# Patient Record
Sex: MALE | Race: WHITE | NOT HISPANIC OR LATINO | Employment: UNEMPLOYED | URBAN - NONMETROPOLITAN AREA
[De-identification: names, ages, dates, MRNs, and addresses within clinical notes are randomized per-mention and may not be internally consistent; named-entity substitution may affect disease eponyms.]

---

## 2021-07-26 ENCOUNTER — OFFICE VISIT (OUTPATIENT)
Dept: PSYCHIATRY | Facility: CLINIC | Age: 46
End: 2021-07-26

## 2021-07-26 ENCOUNTER — LAB (OUTPATIENT)
Dept: LAB | Facility: HOSPITAL | Age: 46
End: 2021-07-26

## 2021-07-26 VITALS
HEART RATE: 75 BPM | DIASTOLIC BLOOD PRESSURE: 76 MMHG | BODY MASS INDEX: 31.57 KG/M2 | SYSTOLIC BLOOD PRESSURE: 131 MMHG | HEIGHT: 66 IN | WEIGHT: 196.4 LBS

## 2021-07-26 DIAGNOSIS — F41.1 GENERALIZED ANXIETY DISORDER: ICD-10-CM

## 2021-07-26 DIAGNOSIS — F33.3 SEVERE EPISODE OF RECURRENT MAJOR DEPRESSIVE DISORDER, WITH PSYCHOTIC FEATURES (HCC): Primary | ICD-10-CM

## 2021-07-26 DIAGNOSIS — Z79.899 MEDICATION MANAGEMENT: ICD-10-CM

## 2021-07-26 LAB
AMPHETAMINE CUT-OFF: NORMAL
BENZODIAZIPINE CUT-OFF: NORMAL
BUPRENORPHINE CUT-OFF: NORMAL
COCAINE CUT-OFF: NORMAL
EXTERNAL AMPHETAMINE SCREEN URINE: NEGATIVE
EXTERNAL BENZODIAZEPINE SCREEN URINE: NEGATIVE
EXTERNAL BUPRENORPHINE SCREEN URINE: NEGATIVE
EXTERNAL COCAINE SCREEN URINE: NEGATIVE
EXTERNAL MDMA: NEGATIVE
EXTERNAL METHADONE SCREEN URINE: NEGATIVE
EXTERNAL METHAMPHETAMINE SCREEN URINE: NEGATIVE
EXTERNAL OPIATES SCREEN URINE: NEGATIVE
EXTERNAL OXYCODONE SCREEN URINE: NEGATIVE
EXTERNAL THC SCREEN URINE: NEGATIVE
MDMA CUT-OFF: NORMAL
METHADONE CUT-OFF: NORMAL
METHAMPHETAMINE CUT-OFF: NORMAL
OPIATES CUT-OFF: NORMAL
OXYCODONE CUT-OFF: NORMAL
THC CUT-OFF: NORMAL

## 2021-07-26 PROCEDURE — 84439 ASSAY OF FREE THYROXINE: CPT | Performed by: NURSE PRACTITIONER

## 2021-07-26 PROCEDURE — 80061 LIPID PANEL: CPT | Performed by: NURSE PRACTITIONER

## 2021-07-26 PROCEDURE — 90792 PSYCH DIAG EVAL W/MED SRVCS: CPT | Performed by: NURSE PRACTITIONER

## 2021-07-26 PROCEDURE — 80050 GENERAL HEALTH PANEL: CPT | Performed by: NURSE PRACTITIONER

## 2021-07-26 PROCEDURE — 83036 HEMOGLOBIN GLYCOSYLATED A1C: CPT | Performed by: NURSE PRACTITIONER

## 2021-07-26 RX ORDER — BUPROPION HYDROCHLORIDE 150 MG/1
150 TABLET ORAL EVERY MORNING
Qty: 30 TABLET | Refills: 0 | Status: SHIPPED | OUTPATIENT
Start: 2021-07-26 | End: 2021-08-18 | Stop reason: SDUPTHER

## 2021-07-26 RX ORDER — INSULIN GLARGINE 100 [IU]/ML
INJECTION, SOLUTION SUBCUTANEOUS
COMMUNITY
Start: 2021-06-10

## 2021-07-26 RX ORDER — IBUPROFEN 600 MG/1
TABLET ORAL
COMMUNITY
Start: 2021-07-16

## 2021-07-26 RX ORDER — VENLAFAXINE HYDROCHLORIDE 37.5 MG/1
37.5 CAPSULE, EXTENDED RELEASE ORAL DAILY
Qty: 30 CAPSULE | Refills: 0 | Status: SHIPPED | OUTPATIENT
Start: 2021-07-26 | End: 2021-08-18 | Stop reason: SDUPTHER

## 2021-07-26 RX ORDER — EMPAGLIFLOZIN 25 MG/1
TABLET, FILM COATED ORAL
COMMUNITY
Start: 2021-06-18

## 2021-07-26 RX ORDER — OMEGA-3-ACID ETHYL ESTERS 1 G/1
CAPSULE, LIQUID FILLED ORAL
COMMUNITY
Start: 2021-05-04

## 2021-07-26 RX ORDER — VENLAFAXINE HYDROCHLORIDE 75 MG/1
CAPSULE, EXTENDED RELEASE ORAL
COMMUNITY
Start: 2021-07-12 | End: 2021-07-26 | Stop reason: SDUPTHER

## 2021-07-26 RX ORDER — ARIPIPRAZOLE 2 MG/1
TABLET ORAL
COMMUNITY
Start: 2021-07-22 | End: 2021-07-26 | Stop reason: SDUPTHER

## 2021-07-26 RX ORDER — SIMVASTATIN 40 MG
TABLET ORAL
COMMUNITY
Start: 2021-07-12

## 2021-07-26 RX ORDER — LEVOCETIRIZINE DIHYDROCHLORIDE 5 MG/1
TABLET, FILM COATED ORAL
COMMUNITY
Start: 2021-07-19

## 2021-07-26 RX ORDER — SITAGLIPTIN 100 MG/1
TABLET, FILM COATED ORAL
COMMUNITY
Start: 2021-06-22

## 2021-07-26 RX ORDER — ALLOPURINOL 300 MG/1
TABLET ORAL
COMMUNITY
Start: 2021-07-12

## 2021-07-26 RX ORDER — OMEPRAZOLE 40 MG/1
CAPSULE, DELAYED RELEASE ORAL
COMMUNITY
Start: 2021-07-12

## 2021-07-26 RX ORDER — TAMSULOSIN HYDROCHLORIDE 0.4 MG/1
CAPSULE ORAL
COMMUNITY
Start: 2021-07-12

## 2021-07-27 LAB
ALBUMIN SERPL-MCNC: 4.7 G/DL (ref 3.5–5.2)
ALBUMIN/GLOB SERPL: 2.6 G/DL
ALP SERPL-CCNC: 77 U/L (ref 39–117)
ALT SERPL W P-5'-P-CCNC: 44 U/L (ref 1–41)
ANION GAP SERPL CALCULATED.3IONS-SCNC: 11.9 MMOL/L (ref 5–15)
AST SERPL-CCNC: 46 U/L (ref 1–40)
BASOPHILS # BLD AUTO: 0.05 10*3/MM3 (ref 0–0.2)
BASOPHILS NFR BLD AUTO: 0.6 % (ref 0–1.5)
BILIRUB SERPL-MCNC: 0.8 MG/DL (ref 0–1.2)
BUN SERPL-MCNC: 12 MG/DL (ref 6–20)
BUN/CREAT SERPL: 11.2 (ref 7–25)
CALCIUM SPEC-SCNC: 9.4 MG/DL (ref 8.6–10.5)
CHLORIDE SERPL-SCNC: 104 MMOL/L (ref 98–107)
CHOLEST SERPL-MCNC: 166 MG/DL (ref 0–200)
CO2 SERPL-SCNC: 27.1 MMOL/L (ref 22–29)
CREAT SERPL-MCNC: 1.07 MG/DL (ref 0.76–1.27)
DEPRECATED RDW RBC AUTO: 46.1 FL (ref 37–54)
EOSINOPHIL # BLD AUTO: 0.14 10*3/MM3 (ref 0–0.4)
EOSINOPHIL NFR BLD AUTO: 1.7 % (ref 0.3–6.2)
ERYTHROCYTE [DISTWIDTH] IN BLOOD BY AUTOMATED COUNT: 13.2 % (ref 12.3–15.4)
GFR SERPL CREATININE-BSD FRML MDRD: 74 ML/MIN/1.73
GLOBULIN UR ELPH-MCNC: 1.8 GM/DL
GLUCOSE SERPL-MCNC: 117 MG/DL (ref 65–99)
HBA1C MFR BLD: 7.51 % (ref 4.8–5.6)
HCT VFR BLD AUTO: 47.9 % (ref 37.5–51)
HDLC SERPL-MCNC: 38 MG/DL (ref 40–60)
HGB BLD-MCNC: 16.2 G/DL (ref 13–17.7)
IMM GRANULOCYTES # BLD AUTO: 0.02 10*3/MM3 (ref 0–0.05)
IMM GRANULOCYTES NFR BLD AUTO: 0.2 % (ref 0–0.5)
LDLC SERPL CALC-MCNC: 77 MG/DL (ref 0–100)
LDLC/HDLC SERPL: 1.72 {RATIO}
LYMPHOCYTES # BLD AUTO: 2.66 10*3/MM3 (ref 0.7–3.1)
LYMPHOCYTES NFR BLD AUTO: 32.6 % (ref 19.6–45.3)
MCH RBC QN AUTO: 31.7 PG (ref 26.6–33)
MCHC RBC AUTO-ENTMCNC: 33.8 G/DL (ref 31.5–35.7)
MCV RBC AUTO: 93.7 FL (ref 79–97)
MONOCYTES # BLD AUTO: 0.55 10*3/MM3 (ref 0.1–0.9)
MONOCYTES NFR BLD AUTO: 6.7 % (ref 5–12)
NEUTROPHILS NFR BLD AUTO: 4.73 10*3/MM3 (ref 1.7–7)
NEUTROPHILS NFR BLD AUTO: 58.2 % (ref 42.7–76)
NRBC BLD AUTO-RTO: 0 /100 WBC (ref 0–0.2)
PLATELET # BLD AUTO: 167 10*3/MM3 (ref 140–450)
PMV BLD AUTO: 11.4 FL (ref 6–12)
POTASSIUM SERPL-SCNC: 3.4 MMOL/L (ref 3.5–5.2)
PROT SERPL-MCNC: 6.5 G/DL (ref 6–8.5)
RBC # BLD AUTO: 5.11 10*6/MM3 (ref 4.14–5.8)
SODIUM SERPL-SCNC: 143 MMOL/L (ref 136–145)
T4 FREE SERPL-MCNC: 1.08 NG/DL (ref 0.93–1.7)
TRIGL SERPL-MCNC: 314 MG/DL (ref 0–150)
TSH SERPL DL<=0.05 MIU/L-ACNC: 3.64 UIU/ML (ref 0.27–4.2)
VLDLC SERPL-MCNC: 51 MG/DL (ref 5–40)
WBC # BLD AUTO: 8.15 10*3/MM3 (ref 3.4–10.8)

## 2021-08-18 DIAGNOSIS — F33.3 SEVERE EPISODE OF RECURRENT MAJOR DEPRESSIVE DISORDER, WITH PSYCHOTIC FEATURES (HCC): ICD-10-CM

## 2021-08-18 DIAGNOSIS — F41.1 GENERALIZED ANXIETY DISORDER: ICD-10-CM

## 2021-08-18 RX ORDER — VENLAFAXINE HYDROCHLORIDE 37.5 MG/1
37.5 CAPSULE, EXTENDED RELEASE ORAL DAILY
Qty: 30 CAPSULE | Refills: 0 | Status: SHIPPED | OUTPATIENT
Start: 2021-08-18 | End: 2021-09-15

## 2021-08-18 RX ORDER — VENLAFAXINE HYDROCHLORIDE 37.5 MG/1
37.5 CAPSULE, EXTENDED RELEASE ORAL DAILY
Qty: 30 CAPSULE | Refills: 0 | Status: CANCELLED | OUTPATIENT
Start: 2021-08-18

## 2021-08-18 RX ORDER — BUPROPION HYDROCHLORIDE 150 MG/1
150 TABLET ORAL EVERY MORNING
Qty: 30 TABLET | Refills: 0 | Status: SHIPPED | OUTPATIENT
Start: 2021-08-18 | End: 2021-09-15 | Stop reason: SDUPTHER

## 2021-08-18 RX ORDER — BUPROPION HYDROCHLORIDE 150 MG/1
150 TABLET ORAL EVERY MORNING
Qty: 30 TABLET | Refills: 0 | Status: CANCELLED | OUTPATIENT
Start: 2021-08-18

## 2021-09-01 NOTE — PROGRESS NOTES
"This provider is located at the [facility] (through [facility]), [address], [place], [address] using a secure [employer] Video Visit through [employer]. Patient is being seen remotely via telehealth at their home address in [place], and stated they are in a secure environment for this session. The patient's condition being diagnosed/treated is appropriate for telemedicine. The provider identified herself as well as her credentials.   The patient, and/or patients guardian, consent to be seen remotely, and when consent is given they understand that the consent allows for patient identifiable information to be sent to a third party as needed.   They may refuse to be seen remotely at any time. The electronic data is encrypted and password protected, and the patient and/or guardian has been advised of the potential risks to privacy not withstanding such measures.    Roscoe Bonilla is a 45 y.o. male who presents today for follow up    Chief Complaint:  Anxiety and depression    History of Present Illness:   Today is a follow-up visit. Wife is present during visit.  He is taking his medication as prescribed, denies side effects. He is doing \"pretty good\".  He is c/o increased anxiety. He states he has been having \"dreams\", not nightmares. Depression rated 10/10, anxiety rated 10/10, with 10 being the worst.  Sleeping is ok, getting about 8 hours a night. Appetite is good.  Denies SI/HI/AVH.  Denies thoughts of self-harm.             The following portions of the patient's history were reviewed and updated as appropriate: allergies, current medications, past family history, past medical history, past social history, past surgical history and problem list.      Past Medical History:  Past Medical History:   Diagnosis Date   • Anxiety    • Depression    • Diabetes (CMS/HCC)    • GERD (gastroesophageal reflux disease)    • Gout    • High cholesterol        Social " History:  Social History     Socioeconomic History   • Marital status:      Spouse name: Not on file   • Number of children: Not on file   • Years of education: Not on file   • Highest education level: Not on file   Tobacco Use   • Smoking status: Never Smoker   • Smokeless tobacco: Never Used   Vaping Use   • Vaping Use: Never used   Substance and Sexual Activity   • Alcohol use: Never   • Drug use: Never   • Sexual activity: Defer       Family History:  History reviewed. No pertinent family history.    Past Surgical History:  Past Surgical History:   Procedure Laterality Date   • APPENDECTOMY     • CHOLECYSTECTOMY         Problem List:  There is no problem list on file for this patient.       Allergy:   Allergies   Allergen Reactions   • Codeine Nausea Only   • Lopid [Gemfibrozil] Hives   • Metformin Dizziness   • Phenergan [Promethazine] Other (See Comments)     Makes patient sleep too much.     • Sulfa Antibiotics Hives, Itching and Nausea Only        Current Medications:   Current Outpatient Medications   Medication Sig Dispense Refill   • allopurinol (ZYLOPRIM) 300 MG tablet      • ARIPiprazole, sensor, 5 MG tablet Take 10 mg by mouth Daily. 60 tablet 0   • buPROPion XL (Wellbutrin XL) 150 MG 24 hr tablet Take 1 tablet by mouth Every Morning. 30 tablet 0   • hydrOXYzine pamoate (Vistaril) 25 MG capsule Take 1 capsule by mouth 3 (Three) Times a Day As Needed for Anxiety. 60 capsule 0   • ibuprofen (ADVIL,MOTRIN) 600 MG tablet      • Januvia 100 MG tablet      • Jardiance 25 MG tablet tablet      • Lantus SoloStar 100 UNIT/ML injection pen      • levocetirizine (XYZAL) 5 MG tablet      • omega-3 acid ethyl esters (LOVAZA) 1 g capsule      • omeprazole (priLOSEC) 40 MG capsule      • simvastatin (ZOCOR) 40 MG tablet      • tamsulosin (FLOMAX) 0.4 MG capsule 24 hr capsule        No current facility-administered medications for this visit.       Review of Symptoms:    Review of Systems   Constitutional:  Negative.    HENT: Negative.    Eyes: Negative.    Respiratory: Negative.    Cardiovascular: Negative.    Neurological: Negative.    Psychiatric/Behavioral: Positive for depressed mood. The patient is nervous/anxious.          Physical Exam:   There were no vitals taken for this visit.  There is no height or weight on file to calculate BMI.    Appearance:  male appears stated age, no acute distress noted.    Gait, Station, Strength: Steady, posture erect, WNL      Mental Status Exam:   Hygiene:   good  Cooperation:  Cooperative  Eye Contact:  Good  Psychomotor Behavior:  Appropriate  Affect:  Appropriate  Mood: normal  Hopelessness: Denies  Speech:  Normal  Thought Process:  Goal directed and Linear  Thought Content:  Normal  Suicidal:  None  Homicidal:  None  Hallucinations:  None  Delusion:  None  Memory:  Intact  Orientation:  Person, Place, Time and Situation  Reliability:  good  Insight:  Good  Judgement:  Good  Impulse Control:  Good  Physical/Medical Issues:  No      PHQ-Score Total:  PHQ-9 Total Score:        Lab Results:   No visits with results within 1 Month(s) from this visit.   Latest known visit with results is:   Office Visit on 07/26/2021   Component Date Value Ref Range Status   • External Amphetamine Screen Urine 07/26/2021 Negative   Final   • Amphetamine Cut-Off 07/26/2021 1000ng/ml   Final   • External Benzodiazepine Screen Uri* 07/26/2021 Negative   Final   • Benzodiazipine Cut-Off 07/26/2021 300ng/ml   Final   • External Cocaine Screen Urine 07/26/2021 Negative   Final   • Cocaine Cut-Off 07/26/2021 300ng/ml   Final   • External THC Screen Urine 07/26/2021 Negative   Final   • THC Cut-Off 07/26/2021 50ng/ml   Final   • External Methadone Screen Urine 07/26/2021 Negative   Final   • Methadone Cut-Off 07/26/2021 300ng/ml   Final   • External Methamphetamine Screen Ur* 07/26/2021 Negative   Final   • Methamphetamine Cut-Off 07/26/2021 1000ng/ml   Final   • External Oxycodone Screen Urine  07/26/2021 Negative   Final   • Oxycodone Cut-Off 07/26/2021 100ng/ml   Final   • External Buprenorphine Screen Urine 07/26/2021 Negative   Final   • Buprenorphine Cut-Off 07/26/2021 10ng/ml   Final   • External MDMA 07/26/2021 Negative   Final   • MDMA Cut-Off 07/26/2021 500ng/ml   Final   • External Opiates Screen Urine 07/26/2021 Negative   Final   • Opiates Cut-Off 07/26/2021 300ng/ml   Final   • Glucose 07/26/2021 117* 65 - 99 mg/dL Final   • BUN 07/26/2021 12  6 - 20 mg/dL Final   • Creatinine 07/26/2021 1.07  0.76 - 1.27 mg/dL Final   • Sodium 07/26/2021 143  136 - 145 mmol/L Final   • Potassium 07/26/2021 3.4* 3.5 - 5.2 mmol/L Final   • Chloride 07/26/2021 104  98 - 107 mmol/L Final   • CO2 07/26/2021 27.1  22.0 - 29.0 mmol/L Final   • Calcium 07/26/2021 9.4  8.6 - 10.5 mg/dL Final   • Total Protein 07/26/2021 6.5  6.0 - 8.5 g/dL Final   • Albumin 07/26/2021 4.70  3.50 - 5.20 g/dL Final   • ALT (SGPT) 07/26/2021 44* 1 - 41 U/L Final   • AST (SGOT) 07/26/2021 46* 1 - 40 U/L Final   • Alkaline Phosphatase 07/26/2021 77  39 - 117 U/L Final   • Total Bilirubin 07/26/2021 0.8  0.0 - 1.2 mg/dL Final   • eGFR Non African Amer 07/26/2021 74  >60 mL/min/1.73 Final   • Globulin 07/26/2021 1.8  gm/dL Final   • A/G Ratio 07/26/2021 2.6  g/dL Final   • BUN/Creatinine Ratio 07/26/2021 11.2  7.0 - 25.0 Final   • Anion Gap 07/26/2021 11.9  5.0 - 15.0 mmol/L Final   • Hemoglobin A1C 07/26/2021 7.51* 4.80 - 5.60 % Final   • Total Cholesterol 07/26/2021 166  0 - 200 mg/dL Final   • Triglycerides 07/26/2021 314* 0 - 150 mg/dL Final   • HDL Cholesterol 07/26/2021 38* 40 - 60 mg/dL Final   • LDL Cholesterol  07/26/2021 77  0 - 100 mg/dL Final   • VLDL Cholesterol 07/26/2021 51* 5 - 40 mg/dL Final   • LDL/HDL Ratio 07/26/2021 1.72   Final   • TSH 07/26/2021 3.640  0.270 - 4.200 uIU/mL Final   • Free T4 07/26/2021 1.08  0.93 - 1.70 ng/dL Final   • WBC 07/26/2021 8.15  3.40 - 10.80 10*3/mm3 Final   • RBC 07/26/2021 5.11  4.14 - 5.80  10*6/mm3 Final   • Hemoglobin 07/26/2021 16.2  13.0 - 17.7 g/dL Final   • Hematocrit 07/26/2021 47.9  37.5 - 51.0 % Final   • MCV 07/26/2021 93.7  79.0 - 97.0 fL Final   • MCH 07/26/2021 31.7  26.6 - 33.0 pg Final   • MCHC 07/26/2021 33.8  31.5 - 35.7 g/dL Final   • RDW 07/26/2021 13.2  12.3 - 15.4 % Final   • RDW-SD 07/26/2021 46.1  37.0 - 54.0 fl Final   • MPV 07/26/2021 11.4  6.0 - 12.0 fL Final   • Platelets 07/26/2021 167  140 - 450 10*3/mm3 Final   • Neutrophil % 07/26/2021 58.2  42.7 - 76.0 % Final   • Lymphocyte % 07/26/2021 32.6  19.6 - 45.3 % Final   • Monocyte % 07/26/2021 6.7  5.0 - 12.0 % Final   • Eosinophil % 07/26/2021 1.7  0.3 - 6.2 % Final   • Basophil % 07/26/2021 0.6  0.0 - 1.5 % Final   • Immature Grans % 07/26/2021 0.2  0.0 - 0.5 % Final   • Neutrophils, Absolute 07/26/2021 4.73  1.70 - 7.00 10*3/mm3 Final   • Lymphocytes, Absolute 07/26/2021 2.66  0.70 - 3.10 10*3/mm3 Final   • Monocytes, Absolute 07/26/2021 0.55  0.10 - 0.90 10*3/mm3 Final   • Eosinophils, Absolute 07/26/2021 0.14  0.00 - 0.40 10*3/mm3 Final   • Basophils, Absolute 07/26/2021 0.05  0.00 - 0.20 10*3/mm3 Final   • Immature Grans, Absolute 07/26/2021 0.02  0.00 - 0.05 10*3/mm3 Final   • nRBC 07/26/2021 0.0  0.0 - 0.2 /100 WBC Final       Assessment/Plan   Problems Addressed this Visit     None      Visit Diagnoses     Severe episode of recurrent major depressive disorder, with psychotic features (CMS/HCC)        Relevant Medications    ARIPiprazole, sensor, 5 MG tablet    buPROPion XL (Wellbutrin XL) 150 MG 24 hr tablet    hydrOXYzine pamoate (Vistaril) 25 MG capsule    Generalized anxiety disorder        Relevant Medications    ARIPiprazole, sensor, 5 MG tablet    buPROPion XL (Wellbutrin XL) 150 MG 24 hr tablet    hydrOXYzine pamoate (Vistaril) 25 MG capsule      Diagnoses       Codes Comments    Severe episode of recurrent major depressive disorder, with psychotic features (CMS/HCC)     ICD-10-CM: F33.3  ICD-9-CM: 296.34      Generalized anxiety disorder     ICD-10-CM: F41.1  ICD-9-CM: 300.02         Social History     Tobacco Use   Smoking Status Never Smoker   Smokeless Tobacco Never Used     MAGGIE reviewed and appropriate. Patient counseled on use of controlled substances.     -The benefits of a healthy diet and exercise were discussed with patient, especially the positive effects they have on mental health. Patient encouraged to consider lifestyle modification regarding  diet and exercise patterns to maximize results of mental health treatment.  -Reviewed previous available documentation  -Reviewed most recent available labs   -Session began at 1:45 pm and ended at 2:00 pm    Visit Diagnoses:    ICD-10-CM ICD-9-CM   1. Severe episode of recurrent major depressive disorder, with psychotic features (CMS/HCC)  F33.3 296.34   2. Generalized anxiety disorder  F41.1 300.02       TREATMENT PLAN/GOALS: Continue supportive psychotherapy efforts and medications as indicated. Treatment and medication options discussed during today's visit. Patient acknowledged and verbally consented to continue with current treatment plan and was educated on the importance of compliance with treatment and follow-up appointments.    MEDICATION ISSUES:    Discussed medication options and treatment plan of prescribed medication as well as the risks, benefits, and side effects including potential falls, possible impaired driving and metabolic adversities among others. Patient is agreeable to call the office with any worsening of symptoms or onset of side effects. Patient is agreeable to call 911 or go to the nearest ER should he/she begin having SI/HI.     MEDS ORDERED DURING VISIT:  New Medications Ordered This Visit   Medications   • ARIPiprazole, sensor, 5 MG tablet     Sig: Take 10 mg by mouth Daily.     Dispense:  60 tablet     Refill:  0   • buPROPion XL (Wellbutrin XL) 150 MG 24 hr tablet     Sig: Take 1 tablet by mouth Every Morning.     Dispense:  30  tablet     Refill:  0   • hydrOXYzine pamoate (Vistaril) 25 MG capsule     Sig: Take 1 capsule by mouth 3 (Three) Times a Day As Needed for Anxiety.     Dispense:  60 capsule     Refill:  0       Return in about 1 month (around 10/15/2021) for Recheck.   -Discontinue Effexor.  -Increase aripiprazole 5 mg tablet take 2 tablets by mouth daily for depression and anxiety.  -Continue Wellbutrin  mg 24-hour tablet take 1 tablet by mouth every day for anxiety and depression.  -Add hydroxyzine 25 mg capsule take 1 capsule by mouth 3 times a day as needed for anxiety.    I spent 30 minutes caring for Tanner on this date of service. This time includes time spent by me in the following activities: preparing for the visit, obtaining and/or reviewing a separately obtained history, performing a medically appropriate examination and/or evaluation, counseling and educating the patient/family/caregiver, ordering medications, tests, or procedures and documenting information in the medical record               This document has been electronically signed by ARSEN Bowden  September 15, 2021 14:02 EDT    Part of this note may be an electronic transcription/translation of spoken language to printed text using the Dragon Dictation System.

## 2021-09-15 ENCOUNTER — TELEMEDICINE (OUTPATIENT)
Dept: PSYCHIATRY | Facility: CLINIC | Age: 46
End: 2021-09-15

## 2021-09-15 DIAGNOSIS — F41.1 GENERALIZED ANXIETY DISORDER: ICD-10-CM

## 2021-09-15 DIAGNOSIS — F33.3 SEVERE EPISODE OF RECURRENT MAJOR DEPRESSIVE DISORDER, WITH PSYCHOTIC FEATURES (HCC): ICD-10-CM

## 2021-09-15 PROCEDURE — 99214 OFFICE O/P EST MOD 30 MIN: CPT | Performed by: NURSE PRACTITIONER

## 2021-09-15 RX ORDER — BUPROPION HYDROCHLORIDE 150 MG/1
150 TABLET ORAL EVERY MORNING
Qty: 30 TABLET | Refills: 0 | Status: SHIPPED | OUTPATIENT
Start: 2021-09-15 | End: 2021-10-13 | Stop reason: SDUPTHER

## 2021-09-15 RX ORDER — HYDROXYZINE PAMOATE 25 MG/1
25 CAPSULE ORAL 3 TIMES DAILY PRN
Qty: 60 CAPSULE | Refills: 0 | Status: SHIPPED | OUTPATIENT
Start: 2021-09-15 | End: 2021-10-13 | Stop reason: SDUPTHER

## 2021-09-21 ENCOUNTER — TELEPHONE (OUTPATIENT)
Dept: PSYCHIATRY | Facility: CLINIC | Age: 46
End: 2021-09-21

## 2021-09-21 NOTE — TELEPHONE ENCOUNTER
Mother stated that during last visit Abilify was increased to 10mg one time a day. Patient is wanting to know if you could change to 5mg two times a day. If so could you send in a new prescription. She stated that he needed something for later in the day as well.  Mother also stated that since patient started Wellbutrin it has been making him dizzy, he has a headache, nausea, and diarrhea. They want your advise on how to help that situation.

## 2021-10-05 ENCOUNTER — TELEPHONE (OUTPATIENT)
Dept: PSYCHIATRY | Facility: CLINIC | Age: 46
End: 2021-10-05

## 2021-10-05 NOTE — TELEPHONE ENCOUNTER
"Wife called and said that Tanner is getting nervous and anxious \"through the day\" and they are wondering if there's something besides the wellbutrin and Abilify that you could add to help him throughout the day. Please advise. Thank you!  "

## 2021-10-06 DIAGNOSIS — F41.1 GENERALIZED ANXIETY DISORDER: Primary | ICD-10-CM

## 2021-10-06 RX ORDER — BUSPIRONE HYDROCHLORIDE 5 MG/1
5 TABLET ORAL 3 TIMES DAILY
Qty: 90 TABLET | Refills: 0 | Status: SHIPPED | OUTPATIENT
Start: 2021-10-06 | End: 2021-11-04 | Stop reason: SDUPTHER

## 2021-10-12 DIAGNOSIS — F33.3 SEVERE EPISODE OF RECURRENT MAJOR DEPRESSIVE DISORDER, WITH PSYCHOTIC FEATURES (HCC): ICD-10-CM

## 2021-10-12 DIAGNOSIS — F41.1 GENERALIZED ANXIETY DISORDER: ICD-10-CM

## 2021-10-13 DIAGNOSIS — F41.1 GENERALIZED ANXIETY DISORDER: ICD-10-CM

## 2021-10-13 DIAGNOSIS — F33.3 SEVERE EPISODE OF RECURRENT MAJOR DEPRESSIVE DISORDER, WITH PSYCHOTIC FEATURES (HCC): ICD-10-CM

## 2021-10-13 RX ORDER — HYDROXYZINE PAMOATE 25 MG/1
25 CAPSULE ORAL 3 TIMES DAILY PRN
Qty: 60 CAPSULE | Refills: 0 | OUTPATIENT
Start: 2021-10-13

## 2021-10-13 RX ORDER — BUPROPION HYDROCHLORIDE 150 MG/1
150 TABLET ORAL EVERY MORNING
Qty: 30 TABLET | Refills: 0 | OUTPATIENT
Start: 2021-10-13

## 2021-10-13 RX ORDER — BUPROPION HYDROCHLORIDE 150 MG/1
150 TABLET ORAL EVERY MORNING
Qty: 30 TABLET | Refills: 0 | Status: SHIPPED | OUTPATIENT
Start: 2021-10-13 | End: 2021-11-04 | Stop reason: SDUPTHER

## 2021-10-13 RX ORDER — HYDROXYZINE PAMOATE 25 MG/1
25 CAPSULE ORAL 3 TIMES DAILY PRN
Qty: 60 CAPSULE | Refills: 0 | Status: SHIPPED | OUTPATIENT
Start: 2021-10-13 | End: 2021-11-04 | Stop reason: SDUPTHER

## 2021-11-03 NOTE — PROGRESS NOTES
This provider is located at the [facility] (through [facility]), [address], [facility], [address] using a secure [employer] Video Visit through [employer]. Patient is being seen remotely via telehealth at their home address in [place], and stated they are in a secure environment for this session. The patient's condition being diagnosed/treated is appropriate for telemedicine. The provider identified herself as well as her credentials.   The patient, and/or patients guardian, consent to be seen remotely, and when consent is given they understand that the consent allows for patient identifiable information to be sent to a third party as needed.   They may refuse to be seen remotely at any time. The electronic data is encrypted and password protected, and the patient and/or guardian has been advised of the potential risks to privacy not withstanding such measures.    Roscoe Bonilla is a 45 y.o. male who presents today for follow up    Chief Complaint:  Anxiety and depression    History of Present Illness:   Today is a follow-up visit. Wife is present during visit. He waws positive for COVID about 2 months ago.  He is taking his medication as prescribed, denies side effects.  Depression rated 10/10, anxiety rated 10/10, with 10 being the worst. Sleeping is off and on, he has had COVID, he has trouble breathing due to nasal congestion. He states he has nightmares a couple times a week. Appetite is good.  Denies SI/HI/AVH.  Denies thoughts of self-harm.          The following portions of the patient's history were reviewed and updated as appropriate: allergies, current medications, past family history, past medical history, past social history, past surgical history and problem list.      Past Medical History:  Past Medical History:   Diagnosis Date   • Anxiety    • Depression    • Diabetes (HCC)    • GERD (gastroesophageal reflux disease)    • Gout    • High cholesterol         Social History:  Social History     Socioeconomic History   • Marital status:    Tobacco Use   • Smoking status: Never Smoker   • Smokeless tobacco: Never Used   Vaping Use   • Vaping Use: Never used   Substance and Sexual Activity   • Alcohol use: Never   • Drug use: Never   • Sexual activity: Defer       Family History:  History reviewed. No pertinent family history.    Past Surgical History:  Past Surgical History:   Procedure Laterality Date   • APPENDECTOMY     • CHOLECYSTECTOMY         Problem List:  There is no problem list on file for this patient.       Allergy:   Allergies   Allergen Reactions   • Codeine Nausea Only   • Lopid [Gemfibrozil] Hives   • Metformin Dizziness   • Phenergan [Promethazine] Other (See Comments)     Makes patient sleep too much.     • Sulfa Antibiotics Hives, Itching and Nausea Only        Current Medications:   Current Outpatient Medications   Medication Sig Dispense Refill   • allopurinol (ZYLOPRIM) 300 MG tablet      • ARIPiprazole (ABILIFY) 15 MG tablet Take 1 tablet by mouth Daily. 30 tablet 1   • buPROPion XL (Wellbutrin XL) 150 MG 24 hr tablet Take 1 tablet by mouth Every Morning. 30 tablet 1   • busPIRone (BUSPAR) 7.5 MG tablet Take 1 tablet by mouth 3 (Three) Times a Day. 90 tablet 1   • hydrOXYzine pamoate (Vistaril) 25 MG capsule Take 1 capsule by mouth 3 (Three) Times a Day As Needed for Anxiety. 60 capsule 1   • ibuprofen (ADVIL,MOTRIN) 600 MG tablet      • Januvia 100 MG tablet      • Jardiance 25 MG tablet tablet      • Lantus SoloStar 100 UNIT/ML injection pen      • levocetirizine (XYZAL) 5 MG tablet      • omega-3 acid ethyl esters (LOVAZA) 1 g capsule      • omeprazole (priLOSEC) 40 MG capsule      • simvastatin (ZOCOR) 40 MG tablet      • tamsulosin (FLOMAX) 0.4 MG capsule 24 hr capsule        No current facility-administered medications for this visit.       Review of Symptoms:    Review of Systems   Constitutional: Negative.    HENT: Negative.     Eyes: Negative.    Respiratory: Negative.    Cardiovascular: Negative.    Musculoskeletal: Negative.    Neurological: Negative.    Psychiatric/Behavioral: Positive for depressed mood. The patient is nervous/anxious.          Physical Exam:   There were no vitals taken for this visit.  There is no height or weight on file to calculate BMI.    Appearance:  male appears stated age, no acute distress noted.    Gait, Station, Strength: Steady, posture erect, WNL      Mental Status Exam:   Hygiene:   good  Cooperation:  Cooperative  Eye Contact:  Good  Psychomotor Behavior:  Appropriate  Affect:  Appropriate  Mood: normal  Hopelessness: Denies  Speech:  Normal  Thought Process:  Goal directed and Linear  Thought Content:  Normal  Suicidal:  None  Homicidal:  None  Hallucinations:  None  Delusion:  None  Memory:  Intact  Orientation:  Person, Place, Time and Situation  Reliability:  good  Insight:  Fair  Judgement:  Fair  Impulse Control:  Fair  Physical/Medical Issues:  No      PHQ-Score Total:  PHQ-9 Total Score:        Lab Results:   No visits with results within 1 Month(s) from this visit.   Latest known visit with results is:   Office Visit on 07/26/2021   Component Date Value Ref Range Status   • External Amphetamine Screen Urine 07/26/2021 Negative   Final   • Amphetamine Cut-Off 07/26/2021 1000ng/ml   Final   • External Benzodiazepine Screen Uri* 07/26/2021 Negative   Final   • Benzodiazipine Cut-Off 07/26/2021 300ng/ml   Final   • External Cocaine Screen Urine 07/26/2021 Negative   Final   • Cocaine Cut-Off 07/26/2021 300ng/ml   Final   • External THC Screen Urine 07/26/2021 Negative   Final   • THC Cut-Off 07/26/2021 50ng/ml   Final   • External Methadone Screen Urine 07/26/2021 Negative   Final   • Methadone Cut-Off 07/26/2021 300ng/ml   Final   • External Methamphetamine Screen Ur* 07/26/2021 Negative   Final   • Methamphetamine Cut-Off 07/26/2021 1000ng/ml   Final   • External Oxycodone Screen Urine  07/26/2021 Negative   Final   • Oxycodone Cut-Off 07/26/2021 100ng/ml   Final   • External Buprenorphine Screen Urine 07/26/2021 Negative   Final   • Buprenorphine Cut-Off 07/26/2021 10ng/ml   Final   • External MDMA 07/26/2021 Negative   Final   • MDMA Cut-Off 07/26/2021 500ng/ml   Final   • External Opiates Screen Urine 07/26/2021 Negative   Final   • Opiates Cut-Off 07/26/2021 300ng/ml   Final   • Glucose 07/26/2021 117* 65 - 99 mg/dL Final   • BUN 07/26/2021 12  6 - 20 mg/dL Final   • Creatinine 07/26/2021 1.07  0.76 - 1.27 mg/dL Final   • Sodium 07/26/2021 143  136 - 145 mmol/L Final   • Potassium 07/26/2021 3.4* 3.5 - 5.2 mmol/L Final   • Chloride 07/26/2021 104  98 - 107 mmol/L Final   • CO2 07/26/2021 27.1  22.0 - 29.0 mmol/L Final   • Calcium 07/26/2021 9.4  8.6 - 10.5 mg/dL Final   • Total Protein 07/26/2021 6.5  6.0 - 8.5 g/dL Final   • Albumin 07/26/2021 4.70  3.50 - 5.20 g/dL Final   • ALT (SGPT) 07/26/2021 44* 1 - 41 U/L Final   • AST (SGOT) 07/26/2021 46* 1 - 40 U/L Final   • Alkaline Phosphatase 07/26/2021 77  39 - 117 U/L Final   • Total Bilirubin 07/26/2021 0.8  0.0 - 1.2 mg/dL Final   • eGFR Non African Amer 07/26/2021 74  >60 mL/min/1.73 Final   • Globulin 07/26/2021 1.8  gm/dL Final   • A/G Ratio 07/26/2021 2.6  g/dL Final   • BUN/Creatinine Ratio 07/26/2021 11.2  7.0 - 25.0 Final   • Anion Gap 07/26/2021 11.9  5.0 - 15.0 mmol/L Final   • Hemoglobin A1C 07/26/2021 7.51* 4.80 - 5.60 % Final   • Total Cholesterol 07/26/2021 166  0 - 200 mg/dL Final   • Triglycerides 07/26/2021 314* 0 - 150 mg/dL Final   • HDL Cholesterol 07/26/2021 38* 40 - 60 mg/dL Final   • LDL Cholesterol  07/26/2021 77  0 - 100 mg/dL Final   • VLDL Cholesterol 07/26/2021 51* 5 - 40 mg/dL Final   • LDL/HDL Ratio 07/26/2021 1.72   Final   • TSH 07/26/2021 3.640  0.270 - 4.200 uIU/mL Final   • Free T4 07/26/2021 1.08  0.93 - 1.70 ng/dL Final   • WBC 07/26/2021 8.15  3.40 - 10.80 10*3/mm3 Final   • RBC 07/26/2021 5.11  4.14 - 5.80  10*6/mm3 Final   • Hemoglobin 07/26/2021 16.2  13.0 - 17.7 g/dL Final   • Hematocrit 07/26/2021 47.9  37.5 - 51.0 % Final   • MCV 07/26/2021 93.7  79.0 - 97.0 fL Final   • MCH 07/26/2021 31.7  26.6 - 33.0 pg Final   • MCHC 07/26/2021 33.8  31.5 - 35.7 g/dL Final   • RDW 07/26/2021 13.2  12.3 - 15.4 % Final   • RDW-SD 07/26/2021 46.1  37.0 - 54.0 fl Final   • MPV 07/26/2021 11.4  6.0 - 12.0 fL Final   • Platelets 07/26/2021 167  140 - 450 10*3/mm3 Final   • Neutrophil % 07/26/2021 58.2  42.7 - 76.0 % Final   • Lymphocyte % 07/26/2021 32.6  19.6 - 45.3 % Final   • Monocyte % 07/26/2021 6.7  5.0 - 12.0 % Final   • Eosinophil % 07/26/2021 1.7  0.3 - 6.2 % Final   • Basophil % 07/26/2021 0.6  0.0 - 1.5 % Final   • Immature Grans % 07/26/2021 0.2  0.0 - 0.5 % Final   • Neutrophils, Absolute 07/26/2021 4.73  1.70 - 7.00 10*3/mm3 Final   • Lymphocytes, Absolute 07/26/2021 2.66  0.70 - 3.10 10*3/mm3 Final   • Monocytes, Absolute 07/26/2021 0.55  0.10 - 0.90 10*3/mm3 Final   • Eosinophils, Absolute 07/26/2021 0.14  0.00 - 0.40 10*3/mm3 Final   • Basophils, Absolute 07/26/2021 0.05  0.00 - 0.20 10*3/mm3 Final   • Immature Grans, Absolute 07/26/2021 0.02  0.00 - 0.05 10*3/mm3 Final   • nRBC 07/26/2021 0.0  0.0 - 0.2 /100 WBC Final       Assessment/Plan   Problems Addressed this Visit     None      Visit Diagnoses     Severe episode of recurrent major depressive disorder, with psychotic features (HCC)    -  Primary    Relevant Medications    buPROPion XL (Wellbutrin XL) 150 MG 24 hr tablet    busPIRone (BUSPAR) 7.5 MG tablet    hydrOXYzine pamoate (Vistaril) 25 MG capsule    ARIPiprazole (ABILIFY) 15 MG tablet    Generalized anxiety disorder        Relevant Medications    buPROPion XL (Wellbutrin XL) 150 MG 24 hr tablet    busPIRone (BUSPAR) 7.5 MG tablet    hydrOXYzine pamoate (Vistaril) 25 MG capsule    ARIPiprazole (ABILIFY) 15 MG tablet    Medication management          Diagnoses       Codes Comments    Severe episode of  recurrent major depressive disorder, with psychotic features (HCC)    -  Primary ICD-10-CM: F33.3  ICD-9-CM: 296.34     Generalized anxiety disorder     ICD-10-CM: F41.1  ICD-9-CM: 300.02     Medication management     ICD-10-CM: Z79.899  ICD-9-CM: V58.69         Social History     Tobacco Use   Smoking Status Never Smoker   Smokeless Tobacco Never Used     MAGGIE reviewed and appropriate. Patient counseled on use of controlled substances.     -The benefits of a healthy diet and exercise were discussed with patient, especially the positive effects they have on mental health. Patient encouraged to consider lifestyle modification regarding  diet and exercise patterns to maximize results of mental health treatment.  -Reviewed previous available documentation  -Reviewed most recent available labs   -Lengthy discussion with patient on the possible side effects of antipsychotic medications including increased cholesterol, increased blood sugar, and possibility of weight gain.  Also discussed the need to monitor lab work associated with this.  The risk of muscle movement disorders with this class of medication was also discussed.    -Session began at 2:30 pm and ended at 2:45 pm    Visit Diagnoses:    ICD-10-CM ICD-9-CM   1. Severe episode of recurrent major depressive disorder, with psychotic features (HCC)  F33.3 296.34   2. Generalized anxiety disorder  F41.1 300.02   3. Medication management  Z79.899 V58.69       TREATMENT PLAN/GOALS: Continue supportive psychotherapy efforts and medications as indicated. Treatment and medication options discussed during today's visit. Patient acknowledged and verbally consented to continue with current treatment plan and was educated on the importance of compliance with treatment and follow-up appointments.    MEDICATION ISSUES:    Discussed medication options and treatment plan of prescribed medication as well as the risks, benefits, and side effects including potential falls,  possible impaired driving and metabolic adversities among others. Patient is agreeable to call the office with any worsening of symptoms or onset of side effects. Patient is agreeable to call 911 or go to the nearest ER should he/she begin having SI/HI.     MEDS ORDERED DURING VISIT:  New Medications Ordered This Visit   Medications   • buPROPion XL (Wellbutrin XL) 150 MG 24 hr tablet     Sig: Take 1 tablet by mouth Every Morning.     Dispense:  30 tablet     Refill:  1   • busPIRone (BUSPAR) 7.5 MG tablet     Sig: Take 1 tablet by mouth 3 (Three) Times a Day.     Dispense:  90 tablet     Refill:  1   • hydrOXYzine pamoate (Vistaril) 25 MG capsule     Sig: Take 1 capsule by mouth 3 (Three) Times a Day As Needed for Anxiety.     Dispense:  60 capsule     Refill:  1   • ARIPiprazole (ABILIFY) 15 MG tablet     Sig: Take 1 tablet by mouth Daily.     Dispense:  30 tablet     Refill:  1       Return in about 2 months (around 1/4/2022) for Recheck.     -Increase aripiprazole 15 mg tablet take 1 tablet by mouth daily for depression and anxiety.  -Continue Wellbutrin  mg 24-hour tablet take 1 tablet by mouth every day for anxiety and depression.  -Continue hydroxyzine 25 mg capsule take 1 capsule by mouth 3 times a day as needed for anxiety.  -Increase buspirone 7.5 mg tablet take 1 tablet 3 times a day for anxiety.    I spent 30 minutes caring for Tanner on this date of service. This time includes time spent by me in the following activities: preparing for the visit, obtaining and/or reviewing a separately obtained history, performing a medically appropriate examination and/or evaluation, counseling and educating the patient/family/caregiver, ordering medications, tests, or procedures and documenting information in the medical record               This document has been electronically signed by ARSEN Bowden  November 4, 2021 14:46 EDT    Part of this note may be an electronic transcription/translation of  spoken language to printed text using the Dragon Dictation System.

## 2021-11-04 ENCOUNTER — TELEMEDICINE (OUTPATIENT)
Dept: PSYCHIATRY | Facility: CLINIC | Age: 46
End: 2021-11-04

## 2021-11-04 DIAGNOSIS — Z79.899 MEDICATION MANAGEMENT: ICD-10-CM

## 2021-11-04 DIAGNOSIS — F41.1 GENERALIZED ANXIETY DISORDER: ICD-10-CM

## 2021-11-04 DIAGNOSIS — F33.3 SEVERE EPISODE OF RECURRENT MAJOR DEPRESSIVE DISORDER, WITH PSYCHOTIC FEATURES (HCC): Primary | ICD-10-CM

## 2021-11-04 PROCEDURE — 99214 OFFICE O/P EST MOD 30 MIN: CPT | Performed by: NURSE PRACTITIONER

## 2021-11-04 RX ORDER — BUSPIRONE HYDROCHLORIDE 7.5 MG/1
7.5 TABLET ORAL 3 TIMES DAILY
Qty: 90 TABLET | Refills: 1 | Status: SHIPPED | OUTPATIENT
Start: 2021-11-04 | End: 2021-12-03 | Stop reason: SDUPTHER

## 2021-11-04 RX ORDER — ARIPIPRAZOLE 15 MG/1
15 TABLET ORAL DAILY
Qty: 30 TABLET | Refills: 1 | Status: SHIPPED | OUTPATIENT
Start: 2021-11-04 | End: 2021-12-03 | Stop reason: SDUPTHER

## 2021-11-04 RX ORDER — BUPROPION HYDROCHLORIDE 150 MG/1
150 TABLET ORAL EVERY MORNING
Qty: 30 TABLET | Refills: 1 | Status: SHIPPED | OUTPATIENT
Start: 2021-11-04 | End: 2021-12-03

## 2021-11-04 RX ORDER — HYDROXYZINE PAMOATE 25 MG/1
25 CAPSULE ORAL 3 TIMES DAILY PRN
Qty: 60 CAPSULE | Refills: 1 | Status: SHIPPED | OUTPATIENT
Start: 2021-11-04 | End: 2021-12-03 | Stop reason: SDUPTHER

## 2021-11-18 ENCOUNTER — TELEPHONE (OUTPATIENT)
Dept: PSYCHIATRY | Facility: CLINIC | Age: 46
End: 2021-11-18

## 2021-11-18 NOTE — TELEPHONE ENCOUNTER
Mireya stated that patients appetite has increased since being prescribed Wellbutrin. They are wanting medication changed.

## 2021-12-01 NOTE — PROGRESS NOTES
"This provider is located at the [facility] (through [facility]), [address], [facility], [address] using a secure [employer] Video Visit through [employer]. Patient is being seen remotely via telehealth at their home address in [place], and stated they are in a secure environment for this session. The patient's condition being diagnosed/treated is appropriate for telemedicine. The provider identified herself as well as her credentials.   The patient, and/or patients guardian, consent to be seen remotely, and when consent is given they understand that the consent allows for patient identifiable information to be sent to a third party as needed.   They may refuse to be seen remotely at any time. The electronic data is encrypted and password protected, and the patient and/or guardian has been advised of the potential risks to privacy not withstanding such measures.    Roscoe Bonilla is a 46 y.o. male who presents today for follow up    Chief Complaint:  Anxiety and depression    History of Present Illness:   Today is a follow-up visit. His wife and Shanelle, () is present during visit. He is taking medication as directed, he isn't tolerating Wellbutrin.  Depression rated 10/10, anxiety rated 10/10, with 10 being the worst.  Sleeping is good, he tends to sleep \"too much\", states \"it has increased\" since starting the Wellbutrin. Appetite is good. He tends to overeat.  Denies SI/HI/AVH.  Denies thoughts of self-harm. Chronic health issues, no acute physical or medical issues today.       The following portions of the patient's history were reviewed and updated as appropriate: allergies, current medications, past family history, past medical history, past social history, past surgical history and problem list.      Past Medical History:  Past Medical History:   Diagnosis Date   • Anxiety    • Depression    • Diabetes (HCC)    • GERD (gastroesophageal reflux " disease)    • Gout    • High cholesterol        Social History:  Social History     Socioeconomic History   • Marital status:    Tobacco Use   • Smoking status: Never Smoker   • Smokeless tobacco: Never Used   Vaping Use   • Vaping Use: Never used   Substance and Sexual Activity   • Alcohol use: Never   • Drug use: Never   • Sexual activity: Defer       Family History:  History reviewed. No pertinent family history.    Past Surgical History:  Past Surgical History:   Procedure Laterality Date   • APPENDECTOMY     • CHOLECYSTECTOMY         Problem List:  There is no problem list on file for this patient.       Allergy:   Allergies   Allergen Reactions   • Codeine Nausea Only   • Lopid [Gemfibrozil] Hives   • Metformin Dizziness   • Phenergan [Promethazine] Other (See Comments)     Makes patient sleep too much.     • Sulfa Antibiotics Hives, Itching and Nausea Only        Current Medications:   Current Outpatient Medications   Medication Sig Dispense Refill   • allopurinol (ZYLOPRIM) 300 MG tablet      • ARIPiprazole (ABILIFY) 15 MG tablet Take 1 tablet by mouth Daily. 30 tablet 0   • busPIRone (BUSPAR) 7.5 MG tablet Take 1 tablet by mouth 3 (Three) Times a Day. 90 tablet 0   • hydrOXYzine pamoate (Vistaril) 25 MG capsule Take 1 capsule by mouth 3 (Three) Times a Day As Needed for Anxiety. 60 capsule 0   • ibuprofen (ADVIL,MOTRIN) 600 MG tablet      • Januvia 100 MG tablet      • Jardiance 25 MG tablet tablet      • Lantus SoloStar 100 UNIT/ML injection pen      • levocetirizine (XYZAL) 5 MG tablet      • omega-3 acid ethyl esters (LOVAZA) 1 g capsule      • omeprazole (priLOSEC) 40 MG capsule      • simvastatin (ZOCOR) 40 MG tablet      • tamsulosin (FLOMAX) 0.4 MG capsule 24 hr capsule        No current facility-administered medications for this visit.       Review of Symptoms:    Review of Systems   Constitutional: Negative.    HENT: Negative.    Eyes: Negative.    Respiratory: Negative.    Cardiovascular:  Negative.    Musculoskeletal: Negative.    Neurological: Negative.    Psychiatric/Behavioral: Positive for depressed mood. The patient is nervous/anxious.          Physical Exam:   There were no vitals taken for this visit.  There is no height or weight on file to calculate BMI.    Appearance:  male appears stated age, no acute distress noted.    Gait, Station, Strength: Steady, posture erect, WNL      Mental Status Exam:   Hygiene:   good  Cooperation:  Cooperative  Eye Contact:  Good  Psychomotor Behavior:  Appropriate  Affect:  Appropriate  Mood: normal  Hopelessness: Denies  Speech:  Normal  Thought Process:  Goal directed and Linear  Thought Content:  Normal  Suicidal:  None  Homicidal:  None  Hallucinations:  None  Delusion:  None  Memory:  Intact  Orientation:  Person, Place, Time and Situation  Reliability:  fair  Insight:  Poor  Judgement:  Poor  Impulse Control:  Fair  Physical/Medical Issues:  No      PHQ-Score Total:  PHQ-9 Total Score:        Lab Results:   No visits with results within 1 Month(s) from this visit.   Latest known visit with results is:   Office Visit on 07/26/2021   Component Date Value Ref Range Status   • External Amphetamine Screen Urine 07/26/2021 Negative   Final   • Amphetamine Cut-Off 07/26/2021 1000ng/ml   Final   • External Benzodiazepine Screen Uri* 07/26/2021 Negative   Final   • Benzodiazipine Cut-Off 07/26/2021 300ng/ml   Final   • External Cocaine Screen Urine 07/26/2021 Negative   Final   • Cocaine Cut-Off 07/26/2021 300ng/ml   Final   • External THC Screen Urine 07/26/2021 Negative   Final   • THC Cut-Off 07/26/2021 50ng/ml   Final   • External Methadone Screen Urine 07/26/2021 Negative   Final   • Methadone Cut-Off 07/26/2021 300ng/ml   Final   • External Methamphetamine Screen Ur* 07/26/2021 Negative   Final   • Methamphetamine Cut-Off 07/26/2021 1000ng/ml   Final   • External Oxycodone Screen Urine 07/26/2021 Negative   Final   • Oxycodone Cut-Off 07/26/2021  100ng/ml   Final   • External Buprenorphine Screen Urine 07/26/2021 Negative   Final   • Buprenorphine Cut-Off 07/26/2021 10ng/ml   Final   • External MDMA 07/26/2021 Negative   Final   • MDMA Cut-Off 07/26/2021 500ng/ml   Final   • External Opiates Screen Urine 07/26/2021 Negative   Final   • Opiates Cut-Off 07/26/2021 300ng/ml   Final   • Glucose 07/26/2021 117* 65 - 99 mg/dL Final   • BUN 07/26/2021 12  6 - 20 mg/dL Final   • Creatinine 07/26/2021 1.07  0.76 - 1.27 mg/dL Final   • Sodium 07/26/2021 143  136 - 145 mmol/L Final   • Potassium 07/26/2021 3.4* 3.5 - 5.2 mmol/L Final   • Chloride 07/26/2021 104  98 - 107 mmol/L Final   • CO2 07/26/2021 27.1  22.0 - 29.0 mmol/L Final   • Calcium 07/26/2021 9.4  8.6 - 10.5 mg/dL Final   • Total Protein 07/26/2021 6.5  6.0 - 8.5 g/dL Final   • Albumin 07/26/2021 4.70  3.50 - 5.20 g/dL Final   • ALT (SGPT) 07/26/2021 44* 1 - 41 U/L Final   • AST (SGOT) 07/26/2021 46* 1 - 40 U/L Final   • Alkaline Phosphatase 07/26/2021 77  39 - 117 U/L Final   • Total Bilirubin 07/26/2021 0.8  0.0 - 1.2 mg/dL Final   • eGFR Non African Amer 07/26/2021 74  >60 mL/min/1.73 Final   • Globulin 07/26/2021 1.8  gm/dL Final   • A/G Ratio 07/26/2021 2.6  g/dL Final   • BUN/Creatinine Ratio 07/26/2021 11.2  7.0 - 25.0 Final   • Anion Gap 07/26/2021 11.9  5.0 - 15.0 mmol/L Final   • Hemoglobin A1C 07/26/2021 7.51* 4.80 - 5.60 % Final   • Total Cholesterol 07/26/2021 166  0 - 200 mg/dL Final   • Triglycerides 07/26/2021 314* 0 - 150 mg/dL Final   • HDL Cholesterol 07/26/2021 38* 40 - 60 mg/dL Final   • LDL Cholesterol  07/26/2021 77  0 - 100 mg/dL Final   • VLDL Cholesterol 07/26/2021 51* 5 - 40 mg/dL Final   • LDL/HDL Ratio 07/26/2021 1.72   Final   • TSH 07/26/2021 3.640  0.270 - 4.200 uIU/mL Final   • Free T4 07/26/2021 1.08  0.93 - 1.70 ng/dL Final   • WBC 07/26/2021 8.15  3.40 - 10.80 10*3/mm3 Final   • RBC 07/26/2021 5.11  4.14 - 5.80 10*6/mm3 Final   • Hemoglobin 07/26/2021 16.2  13.0 - 17.7  g/dL Final   • Hematocrit 07/26/2021 47.9  37.5 - 51.0 % Final   • MCV 07/26/2021 93.7  79.0 - 97.0 fL Final   • MCH 07/26/2021 31.7  26.6 - 33.0 pg Final   • MCHC 07/26/2021 33.8  31.5 - 35.7 g/dL Final   • RDW 07/26/2021 13.2  12.3 - 15.4 % Final   • RDW-SD 07/26/2021 46.1  37.0 - 54.0 fl Final   • MPV 07/26/2021 11.4  6.0 - 12.0 fL Final   • Platelets 07/26/2021 167  140 - 450 10*3/mm3 Final   • Neutrophil % 07/26/2021 58.2  42.7 - 76.0 % Final   • Lymphocyte % 07/26/2021 32.6  19.6 - 45.3 % Final   • Monocyte % 07/26/2021 6.7  5.0 - 12.0 % Final   • Eosinophil % 07/26/2021 1.7  0.3 - 6.2 % Final   • Basophil % 07/26/2021 0.6  0.0 - 1.5 % Final   • Immature Grans % 07/26/2021 0.2  0.0 - 0.5 % Final   • Neutrophils, Absolute 07/26/2021 4.73  1.70 - 7.00 10*3/mm3 Final   • Lymphocytes, Absolute 07/26/2021 2.66  0.70 - 3.10 10*3/mm3 Final   • Monocytes, Absolute 07/26/2021 0.55  0.10 - 0.90 10*3/mm3 Final   • Eosinophils, Absolute 07/26/2021 0.14  0.00 - 0.40 10*3/mm3 Final   • Basophils, Absolute 07/26/2021 0.05  0.00 - 0.20 10*3/mm3 Final   • Immature Grans, Absolute 07/26/2021 0.02  0.00 - 0.05 10*3/mm3 Final   • nRBC 07/26/2021 0.0  0.0 - 0.2 /100 WBC Final       Assessment/Plan   Problems Addressed this Visit     None      Visit Diagnoses     Severe episode of recurrent major depressive disorder, with psychotic features (HCC)    -  Primary    Relevant Medications    ARIPiprazole (ABILIFY) 15 MG tablet    busPIRone (BUSPAR) 7.5 MG tablet    hydrOXYzine pamoate (Vistaril) 25 MG capsule    Generalized anxiety disorder        Relevant Medications    ARIPiprazole (ABILIFY) 15 MG tablet    busPIRone (BUSPAR) 7.5 MG tablet    hydrOXYzine pamoate (Vistaril) 25 MG capsule    Medication management          Diagnoses       Codes Comments    Severe episode of recurrent major depressive disorder, with psychotic features (HCC)    -  Primary ICD-10-CM: F33.3  ICD-9-CM: 296.34     Generalized anxiety disorder     ICD-10-CM:  F41.1  ICD-9-CM: 300.02     Medication management     ICD-10-CM: Z79.899  ICD-9-CM: V58.69         Social History     Tobacco Use   Smoking Status Never Smoker   Smokeless Tobacco Never Used     MAGGIE reviewed and appropriate. Patient counseled on use of controlled substances.     -The benefits of a healthy diet and exercise were discussed with patient, especially the positive effects they have on mental health. Patient encouraged to consider lifestyle modification regarding  diet and exercise patterns to maximize results of mental health treatment.  -Reviewed previous available documentation  -Reviewed most recent available labs   -Lengthy discussion with patient on the possible side effects of antipsychotic medications including increased cholesterol, increased blood sugar, and possibility of weight gain.  Also discussed the need to monitor lab work associated with this.  The risk of muscle movement disorders with this class of medication was also discussed.    -Session began at 1:05 pm and ended at 1:20 pm    Visit Diagnoses:    ICD-10-CM ICD-9-CM   1. Severe episode of recurrent major depressive disorder, with psychotic features (HCC)  F33.3 296.34   2. Generalized anxiety disorder  F41.1 300.02   3. Medication management  Z79.899 V58.69       TREATMENT PLAN/GOALS: Continue supportive psychotherapy efforts and medications as indicated. Treatment and medication options discussed during today's visit. Patient acknowledged and verbally consented to continue with current treatment plan and was educated on the importance of compliance with treatment and follow-up appointments.    MEDICATION ISSUES:    Discussed medication options and treatment plan of prescribed medication as well as the risks, benefits, and side effects including potential falls, possible impaired driving and metabolic adversities among others. Patient is agreeable to call the office with any worsening of symptoms or onset of side effects. Patient  is agreeable to call 911 or go to the nearest ER should he/she begin having SI/HI.     MEDS ORDERED DURING VISIT:  New Medications Ordered This Visit   Medications   • ARIPiprazole (ABILIFY) 15 MG tablet     Sig: Take 1 tablet by mouth Daily.     Dispense:  30 tablet     Refill:  0   • busPIRone (BUSPAR) 7.5 MG tablet     Sig: Take 1 tablet by mouth 3 (Three) Times a Day.     Dispense:  90 tablet     Refill:  0   • hydrOXYzine pamoate (Vistaril) 25 MG capsule     Sig: Take 1 capsule by mouth 3 (Three) Times a Day As Needed for Anxiety.     Dispense:  60 capsule     Refill:  0       No follow-ups on file.     -Continue aripiprazole 15 mg tablet take 1 tablet by mouth daily for depression and anxiety.  -D/C Wellbutrin   -Continue hydroxyzine 25 mg capsule take 1 capsule by mouth 3 times a day as needed for anxiety.  -Continue buspirone 7.5 mg tablet take 1 tablet 3 times a day for anxiety.    I spent 30 minutes caring for Tanner on this date of service. This time includes time spent by me in the following activities: preparing for the visit, obtaining and/or reviewing a separately obtained history, performing a medically appropriate examination and/or evaluation, counseling and educating the patient/family/caregiver, ordering medications, tests, or procedures and documenting information in the medical record    Interactive Complexity Yes If yes, due to:  Third-Party Involvement Other:              This document has been electronically signed by ARSEN Bowden  December 3, 2021 13:20 EST    Part of this note may be an electronic transcription/translation of spoken language to printed text using the Dragon Dictation System.

## 2021-12-03 ENCOUNTER — TELEMEDICINE (OUTPATIENT)
Dept: PSYCHIATRY | Facility: CLINIC | Age: 46
End: 2021-12-03

## 2021-12-03 DIAGNOSIS — F33.3 SEVERE EPISODE OF RECURRENT MAJOR DEPRESSIVE DISORDER, WITH PSYCHOTIC FEATURES (HCC): Primary | ICD-10-CM

## 2021-12-03 DIAGNOSIS — Z79.899 MEDICATION MANAGEMENT: ICD-10-CM

## 2021-12-03 DIAGNOSIS — F41.1 GENERALIZED ANXIETY DISORDER: ICD-10-CM

## 2021-12-03 PROCEDURE — 99214 OFFICE O/P EST MOD 30 MIN: CPT | Performed by: NURSE PRACTITIONER

## 2021-12-03 RX ORDER — BUSPIRONE HYDROCHLORIDE 7.5 MG/1
7.5 TABLET ORAL 3 TIMES DAILY
Qty: 90 TABLET | Refills: 0 | Status: SHIPPED | OUTPATIENT
Start: 2021-12-03 | End: 2022-01-05 | Stop reason: SDUPTHER

## 2021-12-03 RX ORDER — HYDROXYZINE PAMOATE 25 MG/1
25 CAPSULE ORAL 3 TIMES DAILY PRN
Qty: 60 CAPSULE | Refills: 0 | Status: SHIPPED | OUTPATIENT
Start: 2021-12-03 | End: 2022-01-05 | Stop reason: SDUPTHER

## 2021-12-03 RX ORDER — ARIPIPRAZOLE 15 MG/1
15 TABLET ORAL DAILY
Qty: 30 TABLET | Refills: 0 | Status: SHIPPED | OUTPATIENT
Start: 2021-12-03 | End: 2022-01-05 | Stop reason: SDUPTHER

## 2022-01-04 DIAGNOSIS — F33.3 SEVERE EPISODE OF RECURRENT MAJOR DEPRESSIVE DISORDER, WITH PSYCHOTIC FEATURES: ICD-10-CM

## 2022-01-04 DIAGNOSIS — F41.1 GENERALIZED ANXIETY DISORDER: ICD-10-CM

## 2022-01-05 DIAGNOSIS — F33.3 SEVERE EPISODE OF RECURRENT MAJOR DEPRESSIVE DISORDER, WITH PSYCHOTIC FEATURES: ICD-10-CM

## 2022-01-05 DIAGNOSIS — F41.1 GENERALIZED ANXIETY DISORDER: ICD-10-CM

## 2022-01-05 RX ORDER — BUSPIRONE HYDROCHLORIDE 7.5 MG/1
7.5 TABLET ORAL 3 TIMES DAILY
Qty: 90 TABLET | Refills: 0 | Status: SHIPPED | OUTPATIENT
Start: 2022-01-05 | End: 2022-01-12 | Stop reason: SDUPTHER

## 2022-01-05 RX ORDER — ARIPIPRAZOLE 15 MG/1
15 TABLET ORAL DAILY
Qty: 30 TABLET | Refills: 0 | OUTPATIENT
Start: 2022-01-05

## 2022-01-05 RX ORDER — ARIPIPRAZOLE 15 MG/1
15 TABLET ORAL DAILY
Qty: 30 TABLET | Refills: 0 | Status: SHIPPED | OUTPATIENT
Start: 2022-01-05 | End: 2022-01-12 | Stop reason: SDUPTHER

## 2022-01-05 RX ORDER — HYDROXYZINE PAMOATE 25 MG/1
25 CAPSULE ORAL 3 TIMES DAILY PRN
Qty: 60 CAPSULE | Refills: 0 | Status: SHIPPED | OUTPATIENT
Start: 2022-01-05 | End: 2022-01-12 | Stop reason: SDUPTHER

## 2022-01-05 RX ORDER — BUSPIRONE HYDROCHLORIDE 7.5 MG/1
7.5 TABLET ORAL 3 TIMES DAILY
Qty: 90 TABLET | Refills: 0 | OUTPATIENT
Start: 2022-01-05

## 2022-01-05 RX ORDER — HYDROXYZINE PAMOATE 25 MG/1
25 CAPSULE ORAL 3 TIMES DAILY PRN
Qty: 60 CAPSULE | Refills: 0 | OUTPATIENT
Start: 2022-01-05

## 2022-01-12 ENCOUNTER — TELEMEDICINE (OUTPATIENT)
Dept: PSYCHIATRY | Facility: CLINIC | Age: 47
End: 2022-01-12

## 2022-01-12 DIAGNOSIS — F33.3 SEVERE EPISODE OF RECURRENT MAJOR DEPRESSIVE DISORDER, WITH PSYCHOTIC FEATURES: Primary | ICD-10-CM

## 2022-01-12 DIAGNOSIS — F41.1 GENERALIZED ANXIETY DISORDER: ICD-10-CM

## 2022-01-12 DIAGNOSIS — Z79.899 MEDICATION MANAGEMENT: ICD-10-CM

## 2022-01-12 PROCEDURE — 99214 OFFICE O/P EST MOD 30 MIN: CPT | Performed by: NURSE PRACTITIONER

## 2022-01-12 RX ORDER — OXCARBAZEPINE 150 MG/1
TABLET, FILM COATED ORAL
Qty: 60 TABLET | Refills: 0 | Status: SHIPPED | OUTPATIENT
Start: 2022-01-12 | End: 2022-02-07

## 2022-01-12 RX ORDER — HYDROXYZINE PAMOATE 50 MG/1
50 CAPSULE ORAL 3 TIMES DAILY PRN
Qty: 90 CAPSULE | Refills: 0 | Status: SHIPPED | OUTPATIENT
Start: 2022-01-12 | End: 2022-02-07

## 2022-01-12 RX ORDER — BUSPIRONE HYDROCHLORIDE 10 MG/1
10 TABLET ORAL 3 TIMES DAILY
Qty: 90 TABLET | Refills: 0 | Status: SHIPPED | OUTPATIENT
Start: 2022-01-12 | End: 2022-02-07

## 2022-01-12 RX ORDER — ARIPIPRAZOLE 20 MG/1
20 TABLET ORAL DAILY
Qty: 30 TABLET | Refills: 0 | Status: SHIPPED | OUTPATIENT
Start: 2022-01-12 | End: 2022-02-09 | Stop reason: SDUPTHER

## 2022-01-14 ENCOUNTER — TELEPHONE (OUTPATIENT)
Dept: FAMILY MEDICINE CLINIC | Age: 47
End: 2022-01-14

## 2022-02-07 DIAGNOSIS — F41.1 GENERALIZED ANXIETY DISORDER: ICD-10-CM

## 2022-02-07 DIAGNOSIS — F33.3 SEVERE EPISODE OF RECURRENT MAJOR DEPRESSIVE DISORDER, WITH PSYCHOTIC FEATURES: ICD-10-CM

## 2022-02-07 RX ORDER — BUSPIRONE HYDROCHLORIDE 10 MG/1
10 TABLET ORAL 3 TIMES DAILY
Qty: 90 TABLET | Refills: 0 | Status: SHIPPED | OUTPATIENT
Start: 2022-02-07 | End: 2022-02-09 | Stop reason: SDUPTHER

## 2022-02-07 RX ORDER — OXCARBAZEPINE 150 MG/1
TABLET, FILM COATED ORAL
Qty: 60 TABLET | Refills: 0 | Status: SHIPPED | OUTPATIENT
Start: 2022-02-07 | End: 2022-02-09 | Stop reason: SDUPTHER

## 2022-02-07 RX ORDER — HYDROXYZINE PAMOATE 50 MG/1
50 CAPSULE ORAL 3 TIMES DAILY PRN
Qty: 60 CAPSULE | Refills: 0 | Status: SHIPPED | OUTPATIENT
Start: 2022-02-07 | End: 2022-02-09 | Stop reason: SDUPTHER

## 2022-02-09 ENCOUNTER — TELEMEDICINE (OUTPATIENT)
Dept: PSYCHIATRY | Facility: CLINIC | Age: 47
End: 2022-02-09

## 2022-02-09 DIAGNOSIS — F41.1 GENERALIZED ANXIETY DISORDER: ICD-10-CM

## 2022-02-09 DIAGNOSIS — F33.3 SEVERE EPISODE OF RECURRENT MAJOR DEPRESSIVE DISORDER, WITH PSYCHOTIC FEATURES: Primary | ICD-10-CM

## 2022-02-09 DIAGNOSIS — Z79.899 MEDICATION MANAGEMENT: ICD-10-CM

## 2022-02-09 PROCEDURE — 99214 OFFICE O/P EST MOD 30 MIN: CPT | Performed by: NURSE PRACTITIONER

## 2022-02-09 RX ORDER — OXCARBAZEPINE 150 MG/1
150 TABLET, FILM COATED ORAL 2 TIMES DAILY
Qty: 60 TABLET | Refills: 0 | Status: SHIPPED | OUTPATIENT
Start: 2022-02-09 | End: 2022-03-09 | Stop reason: SDUPTHER

## 2022-02-09 RX ORDER — CLONAZEPAM 0.5 MG/1
0.5 TABLET ORAL 2 TIMES DAILY
Qty: 60 TABLET | Refills: 0 | Status: SHIPPED | OUTPATIENT
Start: 2022-02-09 | End: 2022-03-09

## 2022-02-09 RX ORDER — HYDROXYZINE PAMOATE 50 MG/1
50 CAPSULE ORAL 3 TIMES DAILY PRN
Qty: 60 CAPSULE | Refills: 0 | Status: SHIPPED | OUTPATIENT
Start: 2022-02-09 | End: 2022-04-08 | Stop reason: SDUPTHER

## 2022-02-09 RX ORDER — HYDROXYZINE PAMOATE 50 MG/1
50 CAPSULE ORAL 3 TIMES DAILY PRN
Qty: 60 CAPSULE | Refills: 0 | Status: SHIPPED | OUTPATIENT
Start: 2022-02-09 | End: 2022-02-09

## 2022-02-09 RX ORDER — BUSPIRONE HYDROCHLORIDE 10 MG/1
10 TABLET ORAL 3 TIMES DAILY
Qty: 90 TABLET | Refills: 0 | Status: SHIPPED | OUTPATIENT
Start: 2022-02-09 | End: 2022-03-09 | Stop reason: SDUPTHER

## 2022-02-09 RX ORDER — ARIPIPRAZOLE 20 MG/1
30 TABLET ORAL DAILY
Qty: 30 TABLET | Refills: 0 | Status: SHIPPED | OUTPATIENT
Start: 2022-02-09 | End: 2022-03-09 | Stop reason: SDUPTHER

## 2022-02-15 ENCOUNTER — TELEPHONE (OUTPATIENT)
Dept: PSYCHIATRY | Facility: CLINIC | Age: 47
End: 2022-02-15

## 2022-02-15 NOTE — TELEPHONE ENCOUNTER
Patient is experiencing side affects form Clonazepam. Wife stated that it is causing nausea, diarrhea, dizziness, and increased sleepiness. Patient is requesting that medication to be changed.

## 2022-03-09 ENCOUNTER — TELEMEDICINE (OUTPATIENT)
Dept: PSYCHIATRY | Facility: CLINIC | Age: 47
End: 2022-03-09

## 2022-03-09 DIAGNOSIS — F41.1 GENERALIZED ANXIETY DISORDER: ICD-10-CM

## 2022-03-09 DIAGNOSIS — F33.3 SEVERE EPISODE OF RECURRENT MAJOR DEPRESSIVE DISORDER, WITH PSYCHOTIC FEATURES: Primary | ICD-10-CM

## 2022-03-09 DIAGNOSIS — Z79.899 MEDICATION MANAGEMENT: ICD-10-CM

## 2022-03-09 PROCEDURE — 99213 OFFICE O/P EST LOW 20 MIN: CPT | Performed by: NURSE PRACTITIONER

## 2022-03-09 RX ORDER — OXCARBAZEPINE 150 MG/1
150 TABLET, FILM COATED ORAL 2 TIMES DAILY
Qty: 60 TABLET | Refills: 0 | Status: SHIPPED | OUTPATIENT
Start: 2022-03-09 | End: 2022-04-08

## 2022-03-09 RX ORDER — BUSPIRONE HYDROCHLORIDE 10 MG/1
10 TABLET ORAL 3 TIMES DAILY
Qty: 90 TABLET | Refills: 0 | Status: SHIPPED | OUTPATIENT
Start: 2022-03-09 | End: 2022-04-08 | Stop reason: SDUPTHER

## 2022-03-09 RX ORDER — ARIPIPRAZOLE 20 MG/1
30 TABLET ORAL DAILY
Qty: 30 TABLET | Refills: 0 | Status: SHIPPED | OUTPATIENT
Start: 2022-03-09 | End: 2022-03-21 | Stop reason: SDUPTHER

## 2022-03-09 RX ORDER — SERTRALINE HYDROCHLORIDE 25 MG/1
25 TABLET, FILM COATED ORAL DAILY
Qty: 30 TABLET | Refills: 0 | Status: SHIPPED | OUTPATIENT
Start: 2022-03-09 | End: 2022-04-08

## 2022-03-09 NOTE — PROGRESS NOTES
This provider is located at the [facility] (through [facility]), [address], [place], [address] using a secure ActiveCloud[employer] Video Visit through [employer]. Patient is being seen remotely via telehealth at their home address in [place], and stated they are in a secure environment for this session. The patient's condition being diagnosed/treated is appropriate for telemedicine. The provider identified herself as well as her credentials.   The patient, and/or patients guardian, consent to be seen remotely, and when consent is given they understand that the consent allows for patient identifiable information to be sent to a third party as needed.   They may refuse to be seen remotely at any time. The electronic data is encrypted and password protected, and the patient and/or guardian has been advised of the potential risks to privacy not withstanding such measures.    Unable to complete visit using a video connection to the patient. A phone visit was used to complete this visits. Total time of discussion was 15 minutes.    Roscoe Bonilla is a 46 y.o. male who presents today for follow up    Chief Complaint:  Anxiety and depression    History of Present Illness:   Today is a follow-up visit.  Accompanied by Shanelle, . He continues to feel nervous, didn't tolerate Klonopin, made him sick to his stomach. Depression rated 10/10, anxiety rated 10/10, with 10 being the worst. Sleeping is too much, sleeps during the night, takes naps during the day. He has a few nightmares. He continues to hear voices, it is unchanged. Appetite is good. He lives with his spouse. Denies SI/HI/VH.  Denies thoughts of self-harm. Chronic health issues, no acute physical or medical issues today       The following portions of the patient's history were reviewed and updated as appropriate: allergies, current medications, past family history, past medical history, past social history,  past surgical history and problem list.      Past Medical History:  Past Medical History:   Diagnosis Date   • Anxiety    • Depression    • Diabetes (HCC)    • GERD (gastroesophageal reflux disease)    • Gout    • High cholesterol        Social History:  Social History     Socioeconomic History   • Marital status:    Tobacco Use   • Smoking status: Never Smoker   • Smokeless tobacco: Never Used   Vaping Use   • Vaping Use: Never used   Substance and Sexual Activity   • Alcohol use: Never   • Drug use: Never   • Sexual activity: Defer       Family History:  History reviewed. No pertinent family history.    Past Surgical History:  Past Surgical History:   Procedure Laterality Date   • APPENDECTOMY     • CHOLECYSTECTOMY         Problem List:  There is no problem list on file for this patient.       Allergy:   Allergies   Allergen Reactions   • Codeine Nausea Only   • Lopid [Gemfibrozil] Hives   • Metformin Dizziness   • Phenergan [Promethazine] Other (See Comments)     Makes patient sleep too much.     • Sulfa Antibiotics Hives, Itching and Nausea Only        Current Medications:   Current Outpatient Medications   Medication Sig Dispense Refill   • ARIPiprazole (ABILIFY) 20 MG tablet Take 1.5 tablets by mouth Daily. 30 tablet 0   • busPIRone (BUSPAR) 10 MG tablet Take 1 tablet by mouth 3 (Three) Times a Day. 90 tablet 0   • OXcarbazepine (TRILEPTAL) 150 MG tablet Take 1 tablet by mouth 2 (Two) Times a Day. 60 tablet 0   • allopurinol (ZYLOPRIM) 300 MG tablet      • hydrOXYzine pamoate (VISTARIL) 50 MG capsule Take 1 capsule by mouth 3 (Three) Times a Day As Needed for Anxiety. 60 capsule 0   • ibuprofen (ADVIL,MOTRIN) 600 MG tablet      • Januvia 100 MG tablet      • Jardiance 25 MG tablet tablet      • Lantus SoloStar 100 UNIT/ML injection pen      • levocetirizine (XYZAL) 5 MG tablet      • omega-3 acid ethyl esters (LOVAZA) 1 g capsule      • omeprazole (priLOSEC) 40 MG capsule      • sertraline (Zoloft) 25  MG tablet Take 1 tablet by mouth Daily. 30 tablet 0   • simvastatin (ZOCOR) 40 MG tablet      • tamsulosin (FLOMAX) 0.4 MG capsule 24 hr capsule        No current facility-administered medications for this visit.       Review of Symptoms:    Review of Systems   Constitutional: Negative.    HENT: Negative.    Eyes: Negative.    Respiratory: Negative.    Cardiovascular: Negative.    Musculoskeletal: Negative.    Neurological: Negative.    Psychiatric/Behavioral: Positive for hallucinations, sleep disturbance and depressed mood. Negative for suicidal ideas. The patient is nervous/anxious.          Physical Exam:   There were no vitals taken for this visit.  There is no height or weight on file to calculate BMI.    Appearance:  male appears stated age, no acute distress noted.    Gait, Station, Strength: Steady, posture erect, WNL      Mental Status Exam:   Hygiene:   good  Cooperation:  Cooperative  Eye Contact:  Good  Psychomotor Behavior:  Appropriate  Affect:  Restricted  Mood: depressed and anxious  Hopelessness: Denies  Speech:  Normal  Thought Process:  Goal directed and Linear  Thought Content:  Mood congruent  Suicidal:  None  Homicidal:  None  Hallucinations:  Auditory  Delusion:  None  Memory:  Deficits  Orientation:  Person, Place, Time and Situation  Reliability:  fair  Insight:  Poor  Judgement:  Poor  Impulse Control:  Fair  Physical/Medical Issues:  Yes Diabetes, dyslipidemia, joint pain     PHQ-Score Total:  PHQ-9 Total Score:        Lab Results:   No visits with results within 1 Month(s) from this visit.   Latest known visit with results is:   Office Visit on 07/26/2021   Component Date Value Ref Range Status   • External Amphetamine Screen Urine 07/26/2021 Negative   Final   • Amphetamine Cut-Off 07/26/2021 1000ng/ml   Final   • External Benzodiazepine Screen Uri* 07/26/2021 Negative   Final   • Benzodiazipine Cut-Off 07/26/2021 300ng/ml   Final   • External Cocaine Screen Urine 07/26/2021  Negative   Final   • Cocaine Cut-Off 07/26/2021 300ng/ml   Final   • External THC Screen Urine 07/26/2021 Negative   Final   • THC Cut-Off 07/26/2021 50ng/ml   Final   • External Methadone Screen Urine 07/26/2021 Negative   Final   • Methadone Cut-Off 07/26/2021 300ng/ml   Final   • External Methamphetamine Screen Ur* 07/26/2021 Negative   Final   • Methamphetamine Cut-Off 07/26/2021 1000ng/ml   Final   • External Oxycodone Screen Urine 07/26/2021 Negative   Final   • Oxycodone Cut-Off 07/26/2021 100ng/ml   Final   • External Buprenorphine Screen Urine 07/26/2021 Negative   Final   • Buprenorphine Cut-Off 07/26/2021 10ng/ml   Final   • External MDMA 07/26/2021 Negative   Final   • MDMA Cut-Off 07/26/2021 500ng/ml   Final   • External Opiates Screen Urine 07/26/2021 Negative   Final   • Opiates Cut-Off 07/26/2021 300ng/ml   Final   • Glucose 07/26/2021 117 (A) 65 - 99 mg/dL Final   • BUN 07/26/2021 12  6 - 20 mg/dL Final   • Creatinine 07/26/2021 1.07  0.76 - 1.27 mg/dL Final   • Sodium 07/26/2021 143  136 - 145 mmol/L Final   • Potassium 07/26/2021 3.4 (A) 3.5 - 5.2 mmol/L Final   • Chloride 07/26/2021 104  98 - 107 mmol/L Final   • CO2 07/26/2021 27.1  22.0 - 29.0 mmol/L Final   • Calcium 07/26/2021 9.4  8.6 - 10.5 mg/dL Final   • Total Protein 07/26/2021 6.5  6.0 - 8.5 g/dL Final   • Albumin 07/26/2021 4.70  3.50 - 5.20 g/dL Final   • ALT (SGPT) 07/26/2021 44 (A) 1 - 41 U/L Final   • AST (SGOT) 07/26/2021 46 (A) 1 - 40 U/L Final   • Alkaline Phosphatase 07/26/2021 77  39 - 117 U/L Final   • Total Bilirubin 07/26/2021 0.8  0.0 - 1.2 mg/dL Final   • eGFR Non African Amer 07/26/2021 74  >60 mL/min/1.73 Final   • Globulin 07/26/2021 1.8  gm/dL Final   • A/G Ratio 07/26/2021 2.6  g/dL Final   • BUN/Creatinine Ratio 07/26/2021 11.2  7.0 - 25.0 Final   • Anion Gap 07/26/2021 11.9  5.0 - 15.0 mmol/L Final   • Hemoglobin A1C 07/26/2021 7.51 (A) 4.80 - 5.60 % Final   • Total Cholesterol 07/26/2021 166  0 - 200 mg/dL Final    • Triglycerides 07/26/2021 314 (A) 0 - 150 mg/dL Final   • HDL Cholesterol 07/26/2021 38 (A) 40 - 60 mg/dL Final   • LDL Cholesterol  07/26/2021 77  0 - 100 mg/dL Final   • VLDL Cholesterol 07/26/2021 51 (A) 5 - 40 mg/dL Final   • LDL/HDL Ratio 07/26/2021 1.72   Final   • TSH 07/26/2021 3.640  0.270 - 4.200 uIU/mL Final   • Free T4 07/26/2021 1.08  0.93 - 1.70 ng/dL Final   • WBC 07/26/2021 8.15  3.40 - 10.80 10*3/mm3 Final   • RBC 07/26/2021 5.11  4.14 - 5.80 10*6/mm3 Final   • Hemoglobin 07/26/2021 16.2  13.0 - 17.7 g/dL Final   • Hematocrit 07/26/2021 47.9  37.5 - 51.0 % Final   • MCV 07/26/2021 93.7  79.0 - 97.0 fL Final   • MCH 07/26/2021 31.7  26.6 - 33.0 pg Final   • MCHC 07/26/2021 33.8  31.5 - 35.7 g/dL Final   • RDW 07/26/2021 13.2  12.3 - 15.4 % Final   • RDW-SD 07/26/2021 46.1  37.0 - 54.0 fl Final   • MPV 07/26/2021 11.4  6.0 - 12.0 fL Final   • Platelets 07/26/2021 167  140 - 450 10*3/mm3 Final   • Neutrophil % 07/26/2021 58.2  42.7 - 76.0 % Final   • Lymphocyte % 07/26/2021 32.6  19.6 - 45.3 % Final   • Monocyte % 07/26/2021 6.7  5.0 - 12.0 % Final   • Eosinophil % 07/26/2021 1.7  0.3 - 6.2 % Final   • Basophil % 07/26/2021 0.6  0.0 - 1.5 % Final   • Immature Grans % 07/26/2021 0.2  0.0 - 0.5 % Final   • Neutrophils, Absolute 07/26/2021 4.73  1.70 - 7.00 10*3/mm3 Final   • Lymphocytes, Absolute 07/26/2021 2.66  0.70 - 3.10 10*3/mm3 Final   • Monocytes, Absolute 07/26/2021 0.55  0.10 - 0.90 10*3/mm3 Final   • Eosinophils, Absolute 07/26/2021 0.14  0.00 - 0.40 10*3/mm3 Final   • Basophils, Absolute 07/26/2021 0.05  0.00 - 0.20 10*3/mm3 Final   • Immature Grans, Absolute 07/26/2021 0.02  0.00 - 0.05 10*3/mm3 Final   • nRBC 07/26/2021 0.0  0.0 - 0.2 /100 WBC Final       Assessment/Plan   Problems Addressed this Visit    None     Visit Diagnoses     Severe episode of recurrent major depressive disorder, with psychotic features (HCC)    -  Primary    Relevant Medications    busPIRone (BUSPAR) 10 MG tablet     ARIPiprazole (ABILIFY) 20 MG tablet    OXcarbazepine (TRILEPTAL) 150 MG tablet    sertraline (Zoloft) 25 MG tablet    Generalized anxiety disorder        Relevant Medications    busPIRone (BUSPAR) 10 MG tablet    ARIPiprazole (ABILIFY) 20 MG tablet    OXcarbazepine (TRILEPTAL) 150 MG tablet    sertraline (Zoloft) 25 MG tablet    Medication management          Diagnoses       Codes Comments    Severe episode of recurrent major depressive disorder, with psychotic features (HCC)    -  Primary ICD-10-CM: F33.3  ICD-9-CM: 296.34     Generalized anxiety disorder     ICD-10-CM: F41.1  ICD-9-CM: 300.02     Medication management     ICD-10-CM: Z79.899  ICD-9-CM: V58.69         Social History     Tobacco Use   Smoking Status Never Smoker   Smokeless Tobacco Never Used     MAGGIE reviewed and appropriate. Patient counseled on use of controlled substances.     -The benefits of a healthy diet and exercise were discussed with patient, especially the positive effects they have on mental health. Patient encouraged to consider lifestyle modification regarding  diet and exercise patterns to maximize results of mental health treatment.  -Reviewed previous available documentation  -Reviewed most recent available labs   -Lengthy discussion with patient on the possible side effects of antipsychotic medications including increased cholesterol, increased blood sugar, and possibility of weight gain.  Also discussed the need to monitor lab work associated with this.  The risk of muscle movement disorders with this class of medication was also discussed.  -The patient is being prescribed a controlled substance as part of the treatment plan. -The patient has been educated of appropriate use of the medication, including risks and side effects such as somnolence, limited ability to drive and/or work or function safely, potential for dependence, respiratory depression, falls, change in blood pressure, changes in heart rhythm or heart rate,  activation of other mental illnesses, and overdose among others. Patient is also informed that the medication is to be used by the patient only, and to avoid any combined use of ETOH, or other substances, with this medication unless prescribed and as directed by a Provider.  The patient verbalized understanding and agreement with this in their own words.  I've explained to him that drugs of the SSRI class can have side effects such as weight gain, sexual dysfunction, insomnia, headache, nausea. These medications are generally effective at alleviating symptoms of anxiety and/or depression. Let me know if significant side effects do occur.        -Session began at 4:25 pm and ended at 4:40 pm    Visit Diagnoses:    ICD-10-CM ICD-9-CM   1. Severe episode of recurrent major depressive disorder, with psychotic features (HCC)  F33.3 296.34   2. Generalized anxiety disorder  F41.1 300.02   3. Medication management  Z79.899 V58.69       TREATMENT PLAN/GOALS: Continue supportive psychotherapy efforts and medications as indicated. Treatment and medication options discussed during today's visit. Patient acknowledged and verbally consented to continue with current treatment plan and was educated on the importance of compliance with treatment and follow-up appointments.    MEDICATION ISSUES:    Discussed medication options and treatment plan of prescribed medication as well as the risks, benefits, and side effects including potential falls, possible impaired driving and metabolic adversities among others. Patient is agreeable to call the office with any worsening of symptoms or onset of side effects. Patient is agreeable to call 911 or go to the nearest ER should he/she begin having SI/HI.     MEDS ORDERED DURING VISIT:  New Medications Ordered This Visit   Medications   • busPIRone (BUSPAR) 10 MG tablet     Sig: Take 1 tablet by mouth 3 (Three) Times a Day.     Dispense:  90 tablet     Refill:  0   • ARIPiprazole (ABILIFY) 20  MG tablet     Sig: Take 1.5 tablets by mouth Daily.     Dispense:  30 tablet     Refill:  0   • OXcarbazepine (TRILEPTAL) 150 MG tablet     Sig: Take 1 tablet by mouth 2 (Two) Times a Day.     Dispense:  60 tablet     Refill:  0   • sertraline (Zoloft) 25 MG tablet     Sig: Take 1 tablet by mouth Daily.     Dispense:  30 tablet     Refill:  0       Return in about 1 month (around 4/9/2022) for Recheck.     -Continue aripiprazole 30 mg tablet take 1 tablet by mouth daily for depression and anxiety.  -Continue hydroxyzine 50 mg capsule take 1 capsule by mouth 3 times a day as needed for anxiety.  -Continue buspirone 10 mg tablet take 1 tablet 3 times a day for anxiety.  -Continue Oxcarbazepine 150 mg tablet, take 1 tablet twice daily.  -D/C  Klonopin   -Add Zoloft 25 mg capsule, take 1 capsule every day for depression and anxiety.    I spent 30 minutes caring for Tanner on this date of service. This time includes time spent by me in the following activities: preparing for the visit, obtaining and/or reviewing a separately obtained history, performing a medically appropriate examination and/or evaluation, counseling and educating the patient/family/caregiver, ordering medications, tests, or procedures and documenting information in the medical record    Interactive Complexity Yes If yes, due to:  Third-Party Involvement Other:              This document has been electronically signed by ARSEN Bowden  March 9, 2022 16:39 EST    Part of this note may be an electronic transcription/translation of spoken language to printed text using the Dragon Dictation System.

## 2022-03-21 ENCOUNTER — TELEPHONE (OUTPATIENT)
Dept: PSYCHIATRY | Facility: CLINIC | Age: 47
End: 2022-03-21

## 2022-03-21 DIAGNOSIS — F33.3 SEVERE EPISODE OF RECURRENT MAJOR DEPRESSIVE DISORDER, WITH PSYCHOTIC FEATURES: ICD-10-CM

## 2022-03-21 DIAGNOSIS — F41.1 GENERALIZED ANXIETY DISORDER: ICD-10-CM

## 2022-03-21 RX ORDER — ARIPIPRAZOLE 20 MG/1
30 TABLET ORAL DAILY
Qty: 45 TABLET | Refills: 0 | Status: SHIPPED | OUTPATIENT
Start: 2022-03-21 | End: 2022-04-08 | Stop reason: SDUPTHER

## 2022-03-21 NOTE — TELEPHONE ENCOUNTER
Patients wife called asking if you can send another prescription for a quantity of 45 ambilify because his insurance will not pay for it.

## 2022-04-08 ENCOUNTER — TELEMEDICINE (OUTPATIENT)
Dept: PSYCHIATRY | Facility: CLINIC | Age: 47
End: 2022-04-08

## 2022-04-08 DIAGNOSIS — F41.1 GENERALIZED ANXIETY DISORDER: ICD-10-CM

## 2022-04-08 DIAGNOSIS — F33.3 SEVERE EPISODE OF RECURRENT MAJOR DEPRESSIVE DISORDER, WITH PSYCHOTIC FEATURES: Primary | ICD-10-CM

## 2022-04-08 DIAGNOSIS — Z79.899 MEDICATION MANAGEMENT: ICD-10-CM

## 2022-04-08 PROCEDURE — 99214 OFFICE O/P EST MOD 30 MIN: CPT | Performed by: NURSE PRACTITIONER

## 2022-04-08 RX ORDER — PAROXETINE 10 MG/1
10 TABLET, FILM COATED ORAL DAILY
Qty: 30 TABLET | Refills: 0 | Status: SHIPPED | OUTPATIENT
Start: 2022-04-08 | End: 2022-05-06 | Stop reason: SDUPTHER

## 2022-04-08 RX ORDER — HYDROXYZINE PAMOATE 50 MG/1
50 CAPSULE ORAL 3 TIMES DAILY PRN
Qty: 60 CAPSULE | Refills: 0 | Status: SHIPPED | OUTPATIENT
Start: 2022-04-08 | End: 2022-05-06 | Stop reason: SDUPTHER

## 2022-04-08 RX ORDER — ARIPIPRAZOLE 20 MG/1
30 TABLET ORAL DAILY
Qty: 45 TABLET | Refills: 0 | Status: SHIPPED | OUTPATIENT
Start: 2022-04-08 | End: 2022-05-06 | Stop reason: SDUPTHER

## 2022-04-08 RX ORDER — BENZTROPINE MESYLATE 1 MG/1
1 TABLET ORAL 2 TIMES DAILY
Qty: 60 TABLET | Refills: 2 | Status: SHIPPED | OUTPATIENT
Start: 2022-04-08 | End: 2022-05-06 | Stop reason: SDUPTHER

## 2022-04-08 RX ORDER — BUSPIRONE HYDROCHLORIDE 10 MG/1
10 TABLET ORAL 3 TIMES DAILY
Qty: 90 TABLET | Refills: 0 | Status: SHIPPED | OUTPATIENT
Start: 2022-04-08 | End: 2022-05-06 | Stop reason: SDUPTHER

## 2022-04-12 ENCOUNTER — TELEPHONE (OUTPATIENT)
Dept: PSYCHIATRY | Facility: CLINIC | Age: 47
End: 2022-04-12

## 2022-04-13 DIAGNOSIS — F41.1 GENERALIZED ANXIETY DISORDER: ICD-10-CM

## 2022-04-13 DIAGNOSIS — F33.3 SEVERE EPISODE OF RECURRENT MAJOR DEPRESSIVE DISORDER, WITH PSYCHOTIC FEATURES: Primary | ICD-10-CM

## 2022-04-13 DIAGNOSIS — R11.0 NAUSEA: ICD-10-CM

## 2022-04-13 RX ORDER — ONDANSETRON 4 MG/1
4 TABLET, FILM COATED ORAL DAILY PRN
Qty: 20 TABLET | Refills: 0 | Status: SHIPPED | OUTPATIENT
Start: 2022-04-13 | End: 2023-04-13

## 2022-04-14 ENCOUNTER — PRIOR AUTHORIZATION (OUTPATIENT)
Dept: PSYCHIATRY | Facility: CLINIC | Age: 47
End: 2022-04-14

## 2022-04-15 NOTE — TELEPHONE ENCOUNTER
Approved on April 14  Approved. This drug has been approved under the Member's Medicare Part D benefit. Approved quantity: 45 <> per 30 day(s). You may fill up to a 90 day supply except for those on Specialty Tier 5, which can be filled up to a 30 day supply. Please call the pharmacy to process the prescription claim.

## 2022-05-06 ENCOUNTER — OFFICE VISIT (OUTPATIENT)
Dept: PSYCHIATRY | Facility: CLINIC | Age: 47
End: 2022-05-06

## 2022-05-06 DIAGNOSIS — Z79.899 MEDICATION MANAGEMENT: ICD-10-CM

## 2022-05-06 DIAGNOSIS — F33.3 SEVERE EPISODE OF RECURRENT MAJOR DEPRESSIVE DISORDER, WITH PSYCHOTIC FEATURES: Primary | ICD-10-CM

## 2022-05-06 DIAGNOSIS — R11.0 NAUSEA: ICD-10-CM

## 2022-05-06 DIAGNOSIS — F41.1 GENERALIZED ANXIETY DISORDER: ICD-10-CM

## 2022-05-06 PROCEDURE — 99214 OFFICE O/P EST MOD 30 MIN: CPT | Performed by: NURSE PRACTITIONER

## 2022-05-06 RX ORDER — HYDROXYZINE PAMOATE 25 MG/1
25 CAPSULE ORAL 3 TIMES DAILY PRN
Qty: 90 CAPSULE | Refills: 1 | Status: SHIPPED | OUTPATIENT
Start: 2022-05-06 | End: 2022-07-21 | Stop reason: SDUPTHER

## 2022-05-06 RX ORDER — BENZTROPINE MESYLATE 0.5 MG/1
0.5 TABLET ORAL 2 TIMES DAILY
Qty: 60 TABLET | Refills: 1 | Status: SHIPPED | OUTPATIENT
Start: 2022-05-06 | End: 2022-06-24

## 2022-05-06 RX ORDER — BUSPIRONE HYDROCHLORIDE 10 MG/1
10 TABLET ORAL 3 TIMES DAILY
Qty: 90 TABLET | Refills: 1 | Status: SHIPPED | OUTPATIENT
Start: 2022-05-06 | End: 2022-07-21 | Stop reason: SDUPTHER

## 2022-05-06 RX ORDER — PAROXETINE 10 MG/1
10 TABLET, FILM COATED ORAL DAILY
Qty: 30 TABLET | Refills: 1 | Status: SHIPPED | OUTPATIENT
Start: 2022-05-06 | End: 2022-06-27

## 2022-05-06 RX ORDER — ARIPIPRAZOLE 20 MG/1
30 TABLET ORAL DAILY
Qty: 45 TABLET | Refills: 1 | Status: SHIPPED | OUTPATIENT
Start: 2022-05-06 | End: 2022-07-21 | Stop reason: SDUPTHER

## 2022-05-06 NOTE — PROGRESS NOTES
"This provider is located at [facility], [address] [place] [address], [facility], [address] using a telephone in a secure private environment. The Patient is seen remotely at their home address in [place], using a private telephone.  The patient is unable to be seen through a MyChart Video Visit through [facility] at today's encounter because technical difficulty, therefore a telephone encounter was conducted. Patient is being evaluated/treated via telehealth by telephone, and stated they are in a secure environment for this session. The patient's condition being diagnosed/treated is appropriate for telemedicine. The provider identified herself as well as her credentials.   The patient, and/or patient's guardian, consent to be seen remotely, and when consent is given they understand that the consent allows for patient identifiable information to be sent to a third party as needed.   They may refuse to be seen remotely at any time. The electronic data is encrypted and password protected, and the patient and/or guardian has been advised of the potential risks to privacy not withstanding such measures.    You have chosen to receive care through a telephone visit. Do you consent to use a telephone visit for your medical care today? Yes  This visit has been rescheduled as a phone visit to comply with patient safety concerns in accordance with CDC recommendations.      Rosoce Bonilla is a 46 y.o. male who presents today for follow up    Chief Complaint:  Depression and anxiety    History of Present Illness:    Today is a follow-up visit.  Accompanied by Shanelle, . He states things are \"a little better\". He states the \"pill makes him feel a little dizzy\". He thinks it is from the new medication for the \"shakiness\". Depression rated 10/10, anxiety rated 10/10 with 10 being the worst. Sleeping is good, continues to have nightmares, unchanged. Appetite is good. Denies SI/HI/VH.  Denies thoughts of self-harm. Continues to " hear voices, have not increased.   Chronic health issues, no acute physical or medical issues today       The following portions of the patient's history were reviewed and updated as appropriate: allergies, current medications, past family history, past medical history, past social history, past surgical history and problem list.    Past Medical History:  Past Medical History:   Diagnosis Date   • Anxiety    • Depression    • Diabetes (HCC)    • GERD (gastroesophageal reflux disease)    • Gout    • High cholesterol        Social History:  Social History     Socioeconomic History   • Marital status:    Tobacco Use   • Smoking status: Never Smoker   • Smokeless tobacco: Never Used   Vaping Use   • Vaping Use: Never used   Substance and Sexual Activity   • Alcohol use: Never   • Drug use: Never   • Sexual activity: Defer       Family History:  History reviewed. No pertinent family history.    Past Surgical History:  Past Surgical History:   Procedure Laterality Date   • APPENDECTOMY     • CHOLECYSTECTOMY         Problem List:  There is no problem list on file for this patient.      Allergy:   Allergies   Allergen Reactions   • Codeine Nausea Only   • Lopid [Gemfibrozil] Hives   • Metformin Dizziness   • Phenergan [Promethazine] Other (See Comments)     Makes patient sleep too much.     • Sulfa Antibiotics Hives, Itching and Nausea Only        Current Medications:   Current Outpatient Medications   Medication Sig Dispense Refill   • ARIPiprazole (ABILIFY) 20 MG tablet Take 1.5 tablets by mouth Daily. 45 tablet 1   • benztropine (COGENTIN) 0.5 MG tablet Take 1 tablet by mouth 2 (Two) Times a Day. 60 tablet 1   • busPIRone (BUSPAR) 10 MG tablet Take 1 tablet by mouth 3 (Three) Times a Day. 90 tablet 1   • hydrOXYzine pamoate (VISTARIL) 25 MG capsule Take 1 capsule by mouth 3 (Three) Times a Day As Needed for Anxiety. 90 capsule 1   • PARoxetine (Paxil) 10 MG tablet Take 1 tablet by mouth Daily. 30 tablet 1   •  allopurinol (ZYLOPRIM) 300 MG tablet      • ibuprofen (ADVIL,MOTRIN) 600 MG tablet      • Januvia 100 MG tablet      • Jardiance 25 MG tablet tablet      • Lantus SoloStar 100 UNIT/ML injection pen      • levocetirizine (XYZAL) 5 MG tablet      • omega-3 acid ethyl esters (LOVAZA) 1 g capsule      • omeprazole (priLOSEC) 40 MG capsule      • ondansetron (Zofran) 4 MG tablet Take 1 tablet by mouth Daily As Needed for Nausea or Vomiting. 20 tablet 0   • simvastatin (ZOCOR) 40 MG tablet      • tamsulosin (FLOMAX) 0.4 MG capsule 24 hr capsule        No current facility-administered medications for this visit.       Review of Symptoms:    Review of Systems   Psychiatric/Behavioral: Positive for sleep disturbance and depressed mood. The patient is nervous/anxious.    All other systems reviewed and are negative.        Physical Exam:   There were no vitals taken for this visit.   There is no height or weight on file to calculate BMI.    Due to extenuating circumstances and possible current health risks associated with the patient being present in a clinical setting (with current health restrictions in place in regards to possible COVID 19 transmission/exposure), the patient was seen remotely today via a MyChart Video Visit through [facility] and telephone encounter.  Unable to obtain vital signs due to nature of remote visit.  Height stated at 66 inches.  Weight stated at 196 pounds.         Mental Status Exam: 5/6/22  Hygiene:   unable to assess  Cooperation:  Cooperative  Eye Contact:  unable to assess  Psychomotor Behavior:  unable to assess  Affect:  unable to assess  Mood: euthymic  Hopelessness: Denies  Speech:  Normal  Thought Process:  Goal directed and Linear  Thought Content:  Normal  Suicidal:  None  Homicidal:  None  Hallucinations:  None  Delusion:  None  Memory:  Intact  Orientation:  Person, Place, Time and Situation  Reliability:  fair  Insight:  Fair  Judgement:  Fair  Impulse Control:  Fair  Physical/Medical  Issues:  No      PHQ-Score Total:  PHQ-9 Total Score:        Lab Results:   No visits with results within 1 Month(s) from this visit.   Latest known visit with results is:   Office Visit on 07/26/2021   Component Date Value Ref Range Status   • External Amphetamine Screen Urine 07/26/2021 Negative   Final   • Amphetamine Cut-Off 07/26/2021 1000ng/ml   Final   • External Benzodiazepine Screen Uri* 07/26/2021 Negative   Final   • Benzodiazipine Cut-Off 07/26/2021 300ng/ml   Final   • External Cocaine Screen Urine 07/26/2021 Negative   Final   • Cocaine Cut-Off 07/26/2021 300ng/ml   Final   • External THC Screen Urine 07/26/2021 Negative   Final   • THC Cut-Off 07/26/2021 50ng/ml   Final   • External Methadone Screen Urine 07/26/2021 Negative   Final   • Methadone Cut-Off 07/26/2021 300ng/ml   Final   • External Methamphetamine Screen Ur* 07/26/2021 Negative   Final   • Methamphetamine Cut-Off 07/26/2021 1000ng/ml   Final   • External Oxycodone Screen Urine 07/26/2021 Negative   Final   • Oxycodone Cut-Off 07/26/2021 100ng/ml   Final   • External Buprenorphine Screen Urine 07/26/2021 Negative   Final   • Buprenorphine Cut-Off 07/26/2021 10ng/ml   Final   • External MDMA 07/26/2021 Negative   Final   • MDMA Cut-Off 07/26/2021 500ng/ml   Final   • External Opiates Screen Urine 07/26/2021 Negative   Final   • Opiates Cut-Off 07/26/2021 300ng/ml   Final   • Glucose 07/26/2021 117 (A) 65 - 99 mg/dL Final   • BUN 07/26/2021 12  6 - 20 mg/dL Final   • Creatinine 07/26/2021 1.07  0.76 - 1.27 mg/dL Final   • Sodium 07/26/2021 143  136 - 145 mmol/L Final   • Potassium 07/26/2021 3.4 (A) 3.5 - 5.2 mmol/L Final   • Chloride 07/26/2021 104  98 - 107 mmol/L Final   • CO2 07/26/2021 27.1  22.0 - 29.0 mmol/L Final   • Calcium 07/26/2021 9.4  8.6 - 10.5 mg/dL Final   • Total Protein 07/26/2021 6.5  6.0 - 8.5 g/dL Final   • Albumin 07/26/2021 4.70  3.50 - 5.20 g/dL Final   • ALT (SGPT) 07/26/2021 44 (A) 1 - 41 U/L Final   • AST (SGOT)  07/26/2021 46 (A) 1 - 40 U/L Final   • Alkaline Phosphatase 07/26/2021 77  39 - 117 U/L Final   • Total Bilirubin 07/26/2021 0.8  0.0 - 1.2 mg/dL Final   • eGFR Non African Amer 07/26/2021 74  >60 mL/min/1.73 Final   • Globulin 07/26/2021 1.8  gm/dL Final   • A/G Ratio 07/26/2021 2.6  g/dL Final   • BUN/Creatinine Ratio 07/26/2021 11.2  7.0 - 25.0 Final   • Anion Gap 07/26/2021 11.9  5.0 - 15.0 mmol/L Final   • Hemoglobin A1C 07/26/2021 7.51 (A) 4.80 - 5.60 % Final   • Total Cholesterol 07/26/2021 166  0 - 200 mg/dL Final   • Triglycerides 07/26/2021 314 (A) 0 - 150 mg/dL Final   • HDL Cholesterol 07/26/2021 38 (A) 40 - 60 mg/dL Final   • LDL Cholesterol  07/26/2021 77  0 - 100 mg/dL Final   • VLDL Cholesterol 07/26/2021 51 (A) 5 - 40 mg/dL Final   • LDL/HDL Ratio 07/26/2021 1.72   Final   • TSH 07/26/2021 3.640  0.270 - 4.200 uIU/mL Final   • Free T4 07/26/2021 1.08  0.93 - 1.70 ng/dL Final   • WBC 07/26/2021 8.15  3.40 - 10.80 10*3/mm3 Final   • RBC 07/26/2021 5.11  4.14 - 5.80 10*6/mm3 Final   • Hemoglobin 07/26/2021 16.2  13.0 - 17.7 g/dL Final   • Hematocrit 07/26/2021 47.9  37.5 - 51.0 % Final   • MCV 07/26/2021 93.7  79.0 - 97.0 fL Final   • MCH 07/26/2021 31.7  26.6 - 33.0 pg Final   • MCHC 07/26/2021 33.8  31.5 - 35.7 g/dL Final   • RDW 07/26/2021 13.2  12.3 - 15.4 % Final   • RDW-SD 07/26/2021 46.1  37.0 - 54.0 fl Final   • MPV 07/26/2021 11.4  6.0 - 12.0 fL Final   • Platelets 07/26/2021 167  140 - 450 10*3/mm3 Final   • Neutrophil % 07/26/2021 58.2  42.7 - 76.0 % Final   • Lymphocyte % 07/26/2021 32.6  19.6 - 45.3 % Final   • Monocyte % 07/26/2021 6.7  5.0 - 12.0 % Final   • Eosinophil % 07/26/2021 1.7  0.3 - 6.2 % Final   • Basophil % 07/26/2021 0.6  0.0 - 1.5 % Final   • Immature Grans % 07/26/2021 0.2  0.0 - 0.5 % Final   • Neutrophils, Absolute 07/26/2021 4.73  1.70 - 7.00 10*3/mm3 Final   • Lymphocytes, Absolute 07/26/2021 2.66  0.70 - 3.10 10*3/mm3 Final   • Monocytes, Absolute 07/26/2021 0.55   0.10 - 0.90 10*3/mm3 Final   • Eosinophils, Absolute 07/26/2021 0.14  0.00 - 0.40 10*3/mm3 Final   • Basophils, Absolute 07/26/2021 0.05  0.00 - 0.20 10*3/mm3 Final   • Immature Grans, Absolute 07/26/2021 0.02  0.00 - 0.05 10*3/mm3 Final   • nRBC 07/26/2021 0.0  0.0 - 0.2 /100 WBC Final       Assessment/Plan   Problems Addressed this Visit    None     Visit Diagnoses     Severe episode of recurrent major depressive disorder, with psychotic features (HCC)    -  Primary    Relevant Medications    hydrOXYzine pamoate (VISTARIL) 25 MG capsule    PARoxetine (Paxil) 10 MG tablet    busPIRone (BUSPAR) 10 MG tablet    ARIPiprazole (ABILIFY) 20 MG tablet    Generalized anxiety disorder        Relevant Medications    hydrOXYzine pamoate (VISTARIL) 25 MG capsule    PARoxetine (Paxil) 10 MG tablet    busPIRone (BUSPAR) 10 MG tablet    ARIPiprazole (ABILIFY) 20 MG tablet    Nausea        Medication management          Diagnoses       Codes Comments    Severe episode of recurrent major depressive disorder, with psychotic features (HCC)    -  Primary ICD-10-CM: F33.3  ICD-9-CM: 296.34     Generalized anxiety disorder     ICD-10-CM: F41.1  ICD-9-CM: 300.02     Nausea     ICD-10-CM: R11.0  ICD-9-CM: 787.02     Medication management     ICD-10-CM: Z79.899  ICD-9-CM: V58.69         Social History     Tobacco Use   Smoking Status Never Smoker   Smokeless Tobacco Never Used     MAGGIE reviewed and appropriate. Patient counseled on use of controlled substances.       -The benefits of a healthy diet and exercise were discussed with patient, especially the positive effects they have on mental health. Patient encouraged to consider lifestyle modification regarding  diet and exercise patterns to maximize results of mental health treatment.  -Reviewed previous available documentation  -Reviewed most recent available labs   -Lengthy discussion with patient on the possible side effects of antipsychotic medications including increased  cholesterol, increased blood sugar, and possibility of weight gain.  Also discussed the need to monitor lab work associated with this.  The risk of muscle movement disorders with this class of medication was also discussed.  -I've explained to him that drugs of the SSRI class can have side effects such as weight gain, sexual dysfunction, insomnia, headache, nausea. These medications are generally effective at alleviating symptoms of anxiety and/or depression. Let me know if significant side effects do occur.    - Began at 12:40 pm and ended at 12:55 pm    Visit Diagnoses:    ICD-10-CM ICD-9-CM   1. Severe episode of recurrent major depressive disorder, with psychotic features (HCC)  F33.3 296.34   2. Generalized anxiety disorder  F41.1 300.02   3. Nausea  R11.0 787.02   4. Medication management  Z79.899 V58.69         GOALS:  Short Term Goals: Patient will be compliant with medication, and patient will have no significant medication related side effects.  Patient will be engaged in psychotherapy as indicated.  Patient will report subjective improvement of symptoms.  Long term goals: To stabilize mood and treat/improve subjective symptoms, the patient will stay out of the hospital, the patient will be at an optimal level of functioning, and the patient will take all medications as prescribed.  The patient/guardian verbalized understanding and agreement with goals that were mutually set.      TREATMENT PLAN: Continue supportive psychotherapy efforts and medications as indicated. Pharmacological and Non-Pharmacological treatment options discussed during today's visit. Patient/Guardian acknowledged and verbally consented with current treatment plan and was educated on the importance of compliance with treatment and follow-up appointments.      MEDICATION ISSUES:  Discussed medication options and treatment plan of prescribed medication as well as the risks, benefits, any black box warnings, and side effects including  potential falls, possible impaired driving, and metabolic adversities among others. Patient is agreeable to call the office with any worsening of symptoms or onset of side effects, or if any concerns or questions arise.  The contact information for the office is made available to the patient. Patient is agreeable to call 911 or go to the nearest ER should they begin having any SI/HI, or if any urgent concerns arise. No medication side effects or related complaints today.     MEDS ORDERED DURING VISIT:  New Medications Ordered This Visit   Medications   • hydrOXYzine pamoate (VISTARIL) 25 MG capsule     Sig: Take 1 capsule by mouth 3 (Three) Times a Day As Needed for Anxiety.     Dispense:  90 capsule     Refill:  1   • PARoxetine (Paxil) 10 MG tablet     Sig: Take 1 tablet by mouth Daily.     Dispense:  30 tablet     Refill:  1   • busPIRone (BUSPAR) 10 MG tablet     Sig: Take 1 tablet by mouth 3 (Three) Times a Day.     Dispense:  90 tablet     Refill:  1   • ARIPiprazole (ABILIFY) 20 MG tablet     Sig: Take 1.5 tablets by mouth Daily.     Dispense:  45 tablet     Refill:  1   • benztropine (COGENTIN) 0.5 MG tablet     Sig: Take 1 tablet by mouth 2 (Two) Times a Day.     Dispense:  60 tablet     Refill:  1       Return in about 2 months (around 7/6/2022) for Recheck.    -Continue aripiprazole 20 mg tablet take 1.5  tablets by mouth daily for depression and anxiety.  -Decrease  hydroxyzine 25 mg capsule take 1 capsule by mouth 3 times a day as needed for anxiety.  -Continue buspirone 10 mg tablet take 1 tablet 3 times a day for anxiety.  -Continue Paxil 10 mg tablet take 1 tablet by mouth daily for depression and anxiety  -Decrease Cogentin 0.5 mg tablet take 1 tablet by mouth 2 times a day for tremor          Progress toward goal: Not at goal    Functional Status: Moderate impairment     Prognosis: Guarded with Ongoing Treatment        This document has been electronically signed by Ivanna Grissom  APRN  May 6, 2022 12:53 EDT    Part of this note may be an electronic transcription/translation of spoken language to printed text using the Dragon Dictation System.

## 2022-05-09 DIAGNOSIS — F33.3 SEVERE EPISODE OF RECURRENT MAJOR DEPRESSIVE DISORDER, WITH PSYCHOTIC FEATURES: ICD-10-CM

## 2022-05-09 DIAGNOSIS — F41.1 GENERALIZED ANXIETY DISORDER: ICD-10-CM

## 2022-05-11 RX ORDER — OXCARBAZEPINE 150 MG/1
TABLET, FILM COATED ORAL
Qty: 60 TABLET | Refills: 0 | Status: SHIPPED | OUTPATIENT
Start: 2022-05-11 | End: 2022-06-27

## 2022-06-23 DIAGNOSIS — Z79.899 MEDICATION MANAGEMENT: ICD-10-CM

## 2022-06-23 DIAGNOSIS — F41.1 GENERALIZED ANXIETY DISORDER: ICD-10-CM

## 2022-06-23 DIAGNOSIS — F33.3 SEVERE EPISODE OF RECURRENT MAJOR DEPRESSIVE DISORDER, WITH PSYCHOTIC FEATURES: ICD-10-CM

## 2022-06-23 DIAGNOSIS — F33.3 SEVERE EPISODE OF RECURRENT MAJOR DEPRESSIVE DISORDER, WITH PSYCHOTIC FEATURES: Primary | ICD-10-CM

## 2022-06-24 RX ORDER — BENZTROPINE MESYLATE 0.5 MG/1
TABLET ORAL
Qty: 60 TABLET | Refills: 1 | Status: SHIPPED | OUTPATIENT
Start: 2022-06-24 | End: 2022-07-21 | Stop reason: SDUPTHER

## 2022-06-27 RX ORDER — OXCARBAZEPINE 150 MG/1
TABLET, FILM COATED ORAL
Qty: 60 TABLET | Refills: 0 | Status: SHIPPED | OUTPATIENT
Start: 2022-06-27 | End: 2022-07-21 | Stop reason: SDUPTHER

## 2022-06-27 RX ORDER — PAROXETINE 10 MG/1
TABLET, FILM COATED ORAL
Qty: 30 TABLET | Refills: 1 | Status: SHIPPED | OUTPATIENT
Start: 2022-06-27 | End: 2022-07-21 | Stop reason: SDUPTHER

## 2022-07-08 NOTE — PROGRESS NOTES
This provider is located at the [facility] (through [facility]), [address], [place], [address] using a secure MyChart Video Visit through [employer]. Patient is being seen remotely via telehealth at their home address in [place], and stated they are in a secure environment for this session. The patient's condition being diagnosed/treated is appropriate for telemedicine. The provider identified herself as well as her credentials.   The patient, and/or patients guardian, consent to be seen remotely, and when consent is given they understand that the consent allows for patient identifiable information to be sent to a third party as needed.   They may refuse to be seen remotely at any time. The electronic data is encrypted and password protected, and the patient and/or guardian has been advised of the potential risks to privacy not withstanding such measures.        Roscoe Bonilla is a 46 y.o. male who presents today for follow up    Chief Complaint:  Anxiety and depression    History of Present Illness:   Today is a follow-up visit.  Accompanied by Shanelle .  Depression rated 10/10, anxiety rated 10/10, with 10 being the worst.           He states he couldn't take Zoloft, it caused nausea and vomiting. He just found out his father is very ill. He is having a tremor increasing on his right side, it has worsened over the past 6 months or so. He is going to see his PCP within the next week or so. Depression rated 10/10, anxiety rated 10/10, with 10 being the worst. He continues to hear voices, that hasn't increased. Denies SI/HI/VH.  Denies thoughts of self-harm. Sleeping is good, can sleep too much at times, he continues to have nightmares, hasn't increased or worsened. Chronic health issues, no acute physical or medical issues today       The following portions of the patient's history were reviewed and updated as appropriate: allergies, current  medications, past family history, past medical history, past social history, past surgical history and problem list.      Past Medical History:  Past Medical History:   Diagnosis Date   • Anxiety    • Depression    • Diabetes (HCC)    • GERD (gastroesophageal reflux disease)    • Gout    • High cholesterol        Social History:  Social History     Socioeconomic History   • Marital status:    Tobacco Use   • Smoking status: Never Smoker   • Smokeless tobacco: Never Used   Vaping Use   • Vaping Use: Never used   Substance and Sexual Activity   • Alcohol use: Never   • Drug use: Never   • Sexual activity: Defer       Family History:  No family history on file.    Past Surgical History:  Past Surgical History:   Procedure Laterality Date   • APPENDECTOMY     • CHOLECYSTECTOMY         Problem List:  There is no problem list on file for this patient.       Allergy:   Allergies   Allergen Reactions   • Codeine Nausea Only   • Lopid [Gemfibrozil] Hives   • Metformin Dizziness   • Phenergan [Promethazine] Other (See Comments)     Makes patient sleep too much.     • Sulfa Antibiotics Hives, Itching and Nausea Only        Current Medications:   Current Outpatient Medications   Medication Sig Dispense Refill   • allopurinol (ZYLOPRIM) 300 MG tablet      • ARIPiprazole (ABILIFY) 20 MG tablet Take 1.5 tablets by mouth Daily. 45 tablet 1   • benztropine (COGENTIN) 0.5 MG tablet TAKE ONE TABLET BY MOUTH TWO TIMES A DAY 60 tablet 1   • busPIRone (BUSPAR) 10 MG tablet Take 1 tablet by mouth 3 (Three) Times a Day. 90 tablet 1   • hydrOXYzine pamoate (VISTARIL) 25 MG capsule Take 1 capsule by mouth 3 (Three) Times a Day As Needed for Anxiety. 90 capsule 1   • ibuprofen (ADVIL,MOTRIN) 600 MG tablet      • Januvia 100 MG tablet      • Jardiance 25 MG tablet tablet      • Lantus SoloStar 100 UNIT/ML injection pen      • levocetirizine (XYZAL) 5 MG tablet      • omega-3 acid ethyl esters (LOVAZA) 1 g capsule      • omeprazole  (priLOSEC) 40 MG capsule      • ondansetron (Zofran) 4 MG tablet Take 1 tablet by mouth Daily As Needed for Nausea or Vomiting. 20 tablet 0   • OXcarbazepine (TRILEPTAL) 150 MG tablet TAKE ONE TABLET BY MOUTH TWO TIMES A DAY 60 tablet 0   • PARoxetine (PAXIL) 10 MG tablet TAKE ONE TABLET BY MOUTH EVERY DAY 30 tablet 1   • simvastatin (ZOCOR) 40 MG tablet      • tamsulosin (FLOMAX) 0.4 MG capsule 24 hr capsule        No current facility-administered medications for this visit.       Review of Symptoms:    Review of Systems   Constitutional: Negative.    HENT: Negative.    Eyes: Negative.    Respiratory: Negative.    Cardiovascular: Negative.    Musculoskeletal: Negative.    Neurological: Positive for tremors.   Psychiatric/Behavioral: Positive for hallucinations, sleep disturbance and depressed mood. Negative for suicidal ideas. The patient is nervous/anxious.          Physical Exam:   There were no vitals taken for this visit.  There is no height or weight on file to calculate BMI.    Appearance:  male appears stated age, no acute distress noted.    Gait, Station, Strength: Steady, posture erect, WNL      Mental Status Exam:   Hygiene:   good  Cooperation:  Cooperative  Eye Contact:  Good  Psychomotor Behavior:  Appropriate  Affect:  Restricted  Mood: depressed and anxious  Hopelessness: Denies  Speech:  Normal  Thought Process:  Goal directed and Linear  Thought Content:  Mood congruent  Suicidal:  None  Homicidal:  None  Hallucinations:  Auditory  Delusion:  None  Memory:  Deficits  Orientation:  Person, Place, Time and Situation  Reliability:  fair  Insight:  Poor  Judgement:  Poor  Impulse Control:  Fair  Physical/Medical Issues:  Yes Diabetes, dyslipidemia, joint pain     PHQ-Score Total:  PHQ-9 Total Score:        Lab Results:   No visits with results within 1 Month(s) from this visit.   Latest known visit with results is:   Office Visit on 07/26/2021   Component Date Value Ref Range Status   •  External Amphetamine Screen Urine 07/26/2021 Negative   Final   • Amphetamine Cut-Off 07/26/2021 1000ng/ml   Final   • External Benzodiazepine Screen Uri* 07/26/2021 Negative   Final   • Benzodiazipine Cut-Off 07/26/2021 300ng/ml   Final   • External Cocaine Screen Urine 07/26/2021 Negative   Final   • Cocaine Cut-Off 07/26/2021 300ng/ml   Final   • External THC Screen Urine 07/26/2021 Negative   Final   • THC Cut-Off 07/26/2021 50ng/ml   Final   • External Methadone Screen Urine 07/26/2021 Negative   Final   • Methadone Cut-Off 07/26/2021 300ng/ml   Final   • External Methamphetamine Screen Ur* 07/26/2021 Negative   Final   • Methamphetamine Cut-Off 07/26/2021 1000ng/ml   Final   • External Oxycodone Screen Urine 07/26/2021 Negative   Final   • Oxycodone Cut-Off 07/26/2021 100ng/ml   Final   • External Buprenorphine Screen Urine 07/26/2021 Negative   Final   • Buprenorphine Cut-Off 07/26/2021 10ng/ml   Final   • External MDMA 07/26/2021 Negative   Final   • MDMA Cut-Off 07/26/2021 500ng/ml   Final   • External Opiates Screen Urine 07/26/2021 Negative   Final   • Opiates Cut-Off 07/26/2021 300ng/ml   Final   • Glucose 07/26/2021 117 (A) 65 - 99 mg/dL Final   • BUN 07/26/2021 12  6 - 20 mg/dL Final   • Creatinine 07/26/2021 1.07  0.76 - 1.27 mg/dL Final   • Sodium 07/26/2021 143  136 - 145 mmol/L Final   • Potassium 07/26/2021 3.4 (A) 3.5 - 5.2 mmol/L Final   • Chloride 07/26/2021 104  98 - 107 mmol/L Final   • CO2 07/26/2021 27.1  22.0 - 29.0 mmol/L Final   • Calcium 07/26/2021 9.4  8.6 - 10.5 mg/dL Final   • Total Protein 07/26/2021 6.5  6.0 - 8.5 g/dL Final   • Albumin 07/26/2021 4.70  3.50 - 5.20 g/dL Final   • ALT (SGPT) 07/26/2021 44 (A) 1 - 41 U/L Final   • AST (SGOT) 07/26/2021 46 (A) 1 - 40 U/L Final   • Alkaline Phosphatase 07/26/2021 77  39 - 117 U/L Final   • Total Bilirubin 07/26/2021 0.8  0.0 - 1.2 mg/dL Final   • eGFR Non African Amer 07/26/2021 74  >60 mL/min/1.73 Final   • Globulin 07/26/2021 1.8   gm/dL Final   • A/G Ratio 07/26/2021 2.6  g/dL Final   • BUN/Creatinine Ratio 07/26/2021 11.2  7.0 - 25.0 Final   • Anion Gap 07/26/2021 11.9  5.0 - 15.0 mmol/L Final   • Hemoglobin A1C 07/26/2021 7.51 (A) 4.80 - 5.60 % Final   • Total Cholesterol 07/26/2021 166  0 - 200 mg/dL Final   • Triglycerides 07/26/2021 314 (A) 0 - 150 mg/dL Final   • HDL Cholesterol 07/26/2021 38 (A) 40 - 60 mg/dL Final   • LDL Cholesterol  07/26/2021 77  0 - 100 mg/dL Final   • VLDL Cholesterol 07/26/2021 51 (A) 5 - 40 mg/dL Final   • LDL/HDL Ratio 07/26/2021 1.72   Final   • TSH 07/26/2021 3.640  0.270 - 4.200 uIU/mL Final   • Free T4 07/26/2021 1.08  0.93 - 1.70 ng/dL Final   • WBC 07/26/2021 8.15  3.40 - 10.80 10*3/mm3 Final   • RBC 07/26/2021 5.11  4.14 - 5.80 10*6/mm3 Final   • Hemoglobin 07/26/2021 16.2  13.0 - 17.7 g/dL Final   • Hematocrit 07/26/2021 47.9  37.5 - 51.0 % Final   • MCV 07/26/2021 93.7  79.0 - 97.0 fL Final   • MCH 07/26/2021 31.7  26.6 - 33.0 pg Final   • MCHC 07/26/2021 33.8  31.5 - 35.7 g/dL Final   • RDW 07/26/2021 13.2  12.3 - 15.4 % Final   • RDW-SD 07/26/2021 46.1  37.0 - 54.0 fl Final   • MPV 07/26/2021 11.4  6.0 - 12.0 fL Final   • Platelets 07/26/2021 167  140 - 450 10*3/mm3 Final   • Neutrophil % 07/26/2021 58.2  42.7 - 76.0 % Final   • Lymphocyte % 07/26/2021 32.6  19.6 - 45.3 % Final   • Monocyte % 07/26/2021 6.7  5.0 - 12.0 % Final   • Eosinophil % 07/26/2021 1.7  0.3 - 6.2 % Final   • Basophil % 07/26/2021 0.6  0.0 - 1.5 % Final   • Immature Grans % 07/26/2021 0.2  0.0 - 0.5 % Final   • Neutrophils, Absolute 07/26/2021 4.73  1.70 - 7.00 10*3/mm3 Final   • Lymphocytes, Absolute 07/26/2021 2.66  0.70 - 3.10 10*3/mm3 Final   • Monocytes, Absolute 07/26/2021 0.55  0.10 - 0.90 10*3/mm3 Final   • Eosinophils, Absolute 07/26/2021 0.14  0.00 - 0.40 10*3/mm3 Final   • Basophils, Absolute 07/26/2021 0.05  0.00 - 0.20 10*3/mm3 Final   • Immature Grans, Absolute 07/26/2021 0.02  0.00 - 0.05 10*3/mm3 Final   • nRBC  07/26/2021 0.0  0.0 - 0.2 /100 WBC Final       Assessment & Plan   Problems Addressed this Visit    None     Visit Diagnoses     Severe episode of recurrent major depressive disorder, with psychotic features (HCC)    -  Primary    Generalized anxiety disorder        Medication management          Diagnoses       Codes Comments    Severe episode of recurrent major depressive disorder, with psychotic features (HCC)    -  Primary ICD-10-CM: F33.3  ICD-9-CM: 296.34     Generalized anxiety disorder     ICD-10-CM: F41.1  ICD-9-CM: 300.02     Medication management     ICD-10-CM: Z79.899  ICD-9-CM: V58.69         Social History     Tobacco Use   Smoking Status Never Smoker   Smokeless Tobacco Never Used     MAGGIE reviewed and appropriate. Patient counseled on use of controlled substances.     -The benefits of a healthy diet and exercise were discussed with patient, especially the positive effects they have on mental health. Patient encouraged to consider lifestyle modification regarding  diet and exercise patterns to maximize results of mental health treatment.  -Reviewed previous available documentation  -Reviewed most recent available labs   -Lengthy discussion with patient on the possible side effects of antipsychotic medications including increased cholesterol, increased blood sugar, and possibility of weight gain.  Also discussed the need to monitor lab work associated with this.  The risk of muscle movement disorders with this class of medication was also discussed.  -The patient is being prescribed a controlled substance as part of the treatment plan. -The patient has been educated of appropriate use of the medication, including risks and side effects such as somnolence, limited ability to drive and/or work or function safely, potential for dependence, respiratory depression, falls, change in blood pressure, changes in heart rhythm or heart rate, activation of other mental illnesses, and overdose among others.  Patient is also informed that the medication is to be used by the patient only, and to avoid any combined use of ETOH, or other substances, with this medication unless prescribed and as directed by a Provider.  The patient verbalized understanding and agreement with this in their own words.  I've explained to him that drugs of the SSRI class can have side effects such as weight gain, sexual dysfunction, insomnia, headache, nausea. These medications are generally effective at alleviating symptoms of anxiety and/or depression. Let me know if significant side effects do occur.            Visit Diagnoses:    ICD-10-CM ICD-9-CM   1. Severe episode of recurrent major depressive disorder, with psychotic features (HCC)  F33.3 296.34   2. Generalized anxiety disorder  F41.1 300.02   3. Medication management  Z79.899 V58.69       TREATMENT PLAN/GOALS: Continue supportive psychotherapy efforts and medications as indicated. Treatment and medication options discussed during today's visit. Patient acknowledged and verbally consented to continue with current treatment plan and was educated on the importance of compliance with treatment and follow-up appointments.    MEDICATION ISSUES:    Discussed medication options and treatment plan of prescribed medication as well as the risks, benefits, and side effects including potential falls, possible impaired driving and metabolic adversities among others. Patient is agreeable to call the office with any worsening of symptoms or onset of side effects. Patient is agreeable to call 911 or go to the nearest ER should he/she begin having SI/HI.     MEDS ORDERED DURING VISIT:  No orders of the defined types were placed in this encounter.      No follow-ups on file.     -Continue aripiprazole 30 mg tablet take 1 tablet by mouth daily for depression and anxiety.  -Continue hydroxyzine 50 mg capsule take 1 capsule by mouth 3 times a day as needed for anxiety.  -Continue buspirone 10 mg tablet  take 1 tablet 3 times a day for anxiety.  -Continue Oxcarbazepine 150 mg tablet, take 1 tablet twice daily.  -D/C Zoloft   -Add Paxil 10 mg tablet take 1 tablet by mouth daily for depression and anxiety  -Cogentin 1 mg tablet take 1 tablet by mouth 2 times a day for tremor      I spent *** minutes caring for Tanner on this date of service. This time includes time spent by me in the following activities: {TIMEACTIVITIES:03959}    Interactive Complexity Yes If yes, due to:  Third-Party Involvement Other:              This document has been electronically signed by ARSEN Bowden  July 8, 2022 07:55 EDT    Part of this note may be an electronic transcription/translation of spoken language to printed text using the Dragon Dictation System.

## 2022-07-21 ENCOUNTER — OFFICE VISIT (OUTPATIENT)
Dept: PSYCHIATRY | Facility: CLINIC | Age: 47
End: 2022-07-21

## 2022-07-21 DIAGNOSIS — F33.3 SEVERE EPISODE OF RECURRENT MAJOR DEPRESSIVE DISORDER, WITH PSYCHOTIC FEATURES: Primary | ICD-10-CM

## 2022-07-21 DIAGNOSIS — Z79.899 MEDICATION MANAGEMENT: ICD-10-CM

## 2022-07-21 DIAGNOSIS — F41.1 GENERALIZED ANXIETY DISORDER: ICD-10-CM

## 2022-07-21 PROCEDURE — 99214 OFFICE O/P EST MOD 30 MIN: CPT | Performed by: NURSE PRACTITIONER

## 2022-07-21 RX ORDER — PAROXETINE 10 MG/1
10 TABLET, FILM COATED ORAL DAILY
Qty: 30 TABLET | Refills: 1 | Status: SHIPPED | OUTPATIENT
Start: 2022-07-21 | End: 2022-09-19 | Stop reason: SDUPTHER

## 2022-07-21 RX ORDER — OXCARBAZEPINE 150 MG/1
150 TABLET, FILM COATED ORAL 2 TIMES DAILY
Qty: 60 TABLET | Refills: 1 | Status: SHIPPED | OUTPATIENT
Start: 2022-07-21 | End: 2022-09-19 | Stop reason: SDUPTHER

## 2022-07-21 RX ORDER — ARIPIPRAZOLE 20 MG/1
30 TABLET ORAL DAILY
Qty: 45 TABLET | Refills: 1 | Status: SHIPPED | OUTPATIENT
Start: 2022-07-21 | End: 2022-09-19 | Stop reason: SDUPTHER

## 2022-07-21 RX ORDER — BUSPIRONE HYDROCHLORIDE 15 MG/1
15 TABLET ORAL 3 TIMES DAILY
Qty: 90 TABLET | Refills: 1 | Status: SHIPPED | OUTPATIENT
Start: 2022-07-21 | End: 2022-09-19 | Stop reason: SDUPTHER

## 2022-07-21 RX ORDER — HYDROXYZINE PAMOATE 25 MG/1
25 CAPSULE ORAL 3 TIMES DAILY PRN
Qty: 90 CAPSULE | Refills: 1 | Status: SHIPPED | OUTPATIENT
Start: 2022-07-21 | End: 2022-09-19 | Stop reason: SDUPTHER

## 2022-07-21 RX ORDER — BENZTROPINE MESYLATE 0.5 MG/1
0.5 TABLET ORAL 2 TIMES DAILY
Qty: 60 TABLET | Refills: 1 | Status: SHIPPED | OUTPATIENT
Start: 2022-07-21 | End: 2022-09-19 | Stop reason: SDUPTHER

## 2022-07-21 NOTE — PROGRESS NOTES
This provider is located at [facility], [address] [place] [address], [facility], [address] using a telephone in a secure private environment. The Patient is seen remotely at their home address in [place], using a private telephone.  The patient is unable to be seen through a MyChart Video Visit through [facility] at today's encounter because technical difficulty, therefore a telephone encounter was conducted. Patient is being evaluated/treated via telehealth by telephone, and stated they are in a secure environment for this session. The patient's condition being diagnosed/treated is appropriate for telemedicine. The provider identified herself as well as her credentials.   The patient, and/or patient's guardian, consent to be seen remotely, and when consent is given they understand that the consent allows for patient identifiable information to be sent to a third party as needed.   They may refuse to be seen remotely at any time. The electronic data is encrypted and password protected, and the patient and/or guardian has been advised of the potential risks to privacy not withstanding such measures.    You have chosen to receive care through a telephone visit. Do you consent to use a telephone visit for your medical care today? Yes  This visit has been rescheduled as a phone visit to comply with patient safety concerns in accordance with CDC recommendations.      Roscoe Bonilla is a 46 y.o. male who presents today for follow up    Chief Complaint:  Depression and anxiety    History of Present Illness:    Today is a follow-up visit.  Accompanied by Shanelle . He states his father now has lung cancer, had previous brain cancer.  Depression rated 10/10, anxiety rated 10/10, with 10 being the worst. Denies SI/HI/VH.  Denies thoughts of self-harm. He continues to hear voices, that hasn't increased. He is very worried about his father. Sleeping is improved, but since he found out about his father, he hasn't slept  well. Appetite is fair.  Chronic health issues, no acute physical or medical issues today. He reports tremor has decreased in severity.          The following portions of the patient's history were reviewed and updated as appropriate: allergies, current medications, past family history, past medical history, past social history, past surgical history and problem list.    Past Medical History:  Past Medical History:   Diagnosis Date   • Anxiety    • Depression    • Diabetes (HCC)    • GERD (gastroesophageal reflux disease)    • Gout    • High cholesterol        Social History:  Social History     Socioeconomic History   • Marital status:    Tobacco Use   • Smoking status: Never Smoker   • Smokeless tobacco: Never Used   Vaping Use   • Vaping Use: Never used   Substance and Sexual Activity   • Alcohol use: Never   • Drug use: Never   • Sexual activity: Defer       Family History:  History reviewed. No pertinent family history.    Past Surgical History:  Past Surgical History:   Procedure Laterality Date   • APPENDECTOMY     • CHOLECYSTECTOMY         Problem List:  There is no problem list on file for this patient.      Allergy:   Allergies   Allergen Reactions   • Codeine Nausea Only   • Lopid [Gemfibrozil] Hives   • Metformin Dizziness   • Phenergan [Promethazine] Other (See Comments)     Makes patient sleep too much.     • Sulfa Antibiotics Hives, Itching and Nausea Only        Current Medications:   Current Outpatient Medications   Medication Sig Dispense Refill   • ARIPiprazole (ABILIFY) 20 MG tablet Take 1.5 tablets by mouth Daily. 45 tablet 1   • benztropine (COGENTIN) 0.5 MG tablet Take 1 tablet by mouth 2 (Two) Times a Day. 60 tablet 1   • busPIRone (BUSPAR) 15 MG tablet Take 1 tablet by mouth 3 (Three) Times a Day. 90 tablet 1   • hydrOXYzine pamoate (VISTARIL) 25 MG capsule Take 1 capsule by mouth 3 (Three) Times a Day As Needed for Anxiety. 90 capsule 1   • OXcarbazepine (TRILEPTAL) 150 MG tablet  Take 1 tablet by mouth 2 (Two) Times a Day. 60 tablet 1   • PARoxetine (PAXIL) 10 MG tablet Take 1 tablet by mouth Daily. 30 tablet 1   • allopurinol (ZYLOPRIM) 300 MG tablet      • ibuprofen (ADVIL,MOTRIN) 600 MG tablet      • Januvia 100 MG tablet      • Jardiance 25 MG tablet tablet      • Lantus SoloStar 100 UNIT/ML injection pen      • levocetirizine (XYZAL) 5 MG tablet      • omega-3 acid ethyl esters (LOVAZA) 1 g capsule      • omeprazole (priLOSEC) 40 MG capsule      • ondansetron (Zofran) 4 MG tablet Take 1 tablet by mouth Daily As Needed for Nausea or Vomiting. 20 tablet 0   • simvastatin (ZOCOR) 40 MG tablet      • tamsulosin (FLOMAX) 0.4 MG capsule 24 hr capsule        No current facility-administered medications for this visit.       Review of Symptoms:    Review of Systems   Psychiatric/Behavioral: Positive for sleep disturbance and depressed mood. The patient is nervous/anxious.    All other systems reviewed and are negative.        Physical Exam:   There were no vitals taken for this visit.   There is no height or weight on file to calculate BMI.    Due to extenuating circumstances and possible current health risks associated with the patient being present in a clinical setting (with current health restrictions in place in regards to possible COVID 19 transmission/exposure), the patient was seen remotely today via a MyChart Video Visit through [facility] and telephone encounter.  Unable to obtain vital signs due to nature of remote visit.  Height stated at 66 inches.  Weight stated at 196 pounds.         Mental Status Exam:   Hygiene:   unable to assess  Cooperation:  Cooperative  Eye Contact:  unable to assess  Psychomotor Behavior:  unable to assess  Affect:  unable to assess  Mood: euthymic  Hopelessness: Denies  Speech:  Normal  Thought Process:  Goal directed and Linear  Thought Content:  Normal  Suicidal:  None  Homicidal:  None  Hallucinations:  None  Delusion:  None  Memory:  Intact  Orientation:   Person, Place, Time and Situation  Reliability:  fair  Insight:  Fair  Judgement:  Fair  Impulse Control:  Fair  Physical/Medical Issues:  No      PHQ-Score Total:  PHQ-9 Total Score:        Lab Results:   No visits with results within 1 Month(s) from this visit.   Latest known visit with results is:   Office Visit on 07/26/2021   Component Date Value Ref Range Status   • External Amphetamine Screen Urine 07/26/2021 Negative   Final   • Amphetamine Cut-Off 07/26/2021 1000ng/ml   Final   • External Benzodiazepine Screen Uri* 07/26/2021 Negative   Final   • Benzodiazipine Cut-Off 07/26/2021 300ng/ml   Final   • External Cocaine Screen Urine 07/26/2021 Negative   Final   • Cocaine Cut-Off 07/26/2021 300ng/ml   Final   • External THC Screen Urine 07/26/2021 Negative   Final   • THC Cut-Off 07/26/2021 50ng/ml   Final   • External Methadone Screen Urine 07/26/2021 Negative   Final   • Methadone Cut-Off 07/26/2021 300ng/ml   Final   • External Methamphetamine Screen Ur* 07/26/2021 Negative   Final   • Methamphetamine Cut-Off 07/26/2021 1000ng/ml   Final   • External Oxycodone Screen Urine 07/26/2021 Negative   Final   • Oxycodone Cut-Off 07/26/2021 100ng/ml   Final   • External Buprenorphine Screen Urine 07/26/2021 Negative   Final   • Buprenorphine Cut-Off 07/26/2021 10ng/ml   Final   • External MDMA 07/26/2021 Negative   Final   • MDMA Cut-Off 07/26/2021 500ng/ml   Final   • External Opiates Screen Urine 07/26/2021 Negative   Final   • Opiates Cut-Off 07/26/2021 300ng/ml   Final   • Glucose 07/26/2021 117 (A) 65 - 99 mg/dL Final   • BUN 07/26/2021 12  6 - 20 mg/dL Final   • Creatinine 07/26/2021 1.07  0.76 - 1.27 mg/dL Final   • Sodium 07/26/2021 143  136 - 145 mmol/L Final   • Potassium 07/26/2021 3.4 (A) 3.5 - 5.2 mmol/L Final   • Chloride 07/26/2021 104  98 - 107 mmol/L Final   • CO2 07/26/2021 27.1  22.0 - 29.0 mmol/L Final   • Calcium 07/26/2021 9.4  8.6 - 10.5 mg/dL Final   • Total Protein 07/26/2021 6.5  6.0 - 8.5  g/dL Final   • Albumin 07/26/2021 4.70  3.50 - 5.20 g/dL Final   • ALT (SGPT) 07/26/2021 44 (A) 1 - 41 U/L Final   • AST (SGOT) 07/26/2021 46 (A) 1 - 40 U/L Final   • Alkaline Phosphatase 07/26/2021 77  39 - 117 U/L Final   • Total Bilirubin 07/26/2021 0.8  0.0 - 1.2 mg/dL Final   • eGFR Non African Amer 07/26/2021 74  >60 mL/min/1.73 Final   • Globulin 07/26/2021 1.8  gm/dL Final   • A/G Ratio 07/26/2021 2.6  g/dL Final   • BUN/Creatinine Ratio 07/26/2021 11.2  7.0 - 25.0 Final   • Anion Gap 07/26/2021 11.9  5.0 - 15.0 mmol/L Final   • Hemoglobin A1C 07/26/2021 7.51 (A) 4.80 - 5.60 % Final   • Total Cholesterol 07/26/2021 166  0 - 200 mg/dL Final   • Triglycerides 07/26/2021 314 (A) 0 - 150 mg/dL Final   • HDL Cholesterol 07/26/2021 38 (A) 40 - 60 mg/dL Final   • LDL Cholesterol  07/26/2021 77  0 - 100 mg/dL Final   • VLDL Cholesterol 07/26/2021 51 (A) 5 - 40 mg/dL Final   • LDL/HDL Ratio 07/26/2021 1.72   Final   • TSH 07/26/2021 3.640  0.270 - 4.200 uIU/mL Final   • Free T4 07/26/2021 1.08  0.93 - 1.70 ng/dL Final   • WBC 07/26/2021 8.15  3.40 - 10.80 10*3/mm3 Final   • RBC 07/26/2021 5.11  4.14 - 5.80 10*6/mm3 Final   • Hemoglobin 07/26/2021 16.2  13.0 - 17.7 g/dL Final   • Hematocrit 07/26/2021 47.9  37.5 - 51.0 % Final   • MCV 07/26/2021 93.7  79.0 - 97.0 fL Final   • MCH 07/26/2021 31.7  26.6 - 33.0 pg Final   • MCHC 07/26/2021 33.8  31.5 - 35.7 g/dL Final   • RDW 07/26/2021 13.2  12.3 - 15.4 % Final   • RDW-SD 07/26/2021 46.1  37.0 - 54.0 fl Final   • MPV 07/26/2021 11.4  6.0 - 12.0 fL Final   • Platelets 07/26/2021 167  140 - 450 10*3/mm3 Final   • Neutrophil % 07/26/2021 58.2  42.7 - 76.0 % Final   • Lymphocyte % 07/26/2021 32.6  19.6 - 45.3 % Final   • Monocyte % 07/26/2021 6.7  5.0 - 12.0 % Final   • Eosinophil % 07/26/2021 1.7  0.3 - 6.2 % Final   • Basophil % 07/26/2021 0.6  0.0 - 1.5 % Final   • Immature Grans % 07/26/2021 0.2  0.0 - 0.5 % Final   • Neutrophils, Absolute 07/26/2021 4.73  1.70 - 7.00  10*3/mm3 Final   • Lymphocytes, Absolute 07/26/2021 2.66  0.70 - 3.10 10*3/mm3 Final   • Monocytes, Absolute 07/26/2021 0.55  0.10 - 0.90 10*3/mm3 Final   • Eosinophils, Absolute 07/26/2021 0.14  0.00 - 0.40 10*3/mm3 Final   • Basophils, Absolute 07/26/2021 0.05  0.00 - 0.20 10*3/mm3 Final   • Immature Grans, Absolute 07/26/2021 0.02  0.00 - 0.05 10*3/mm3 Final   • nRBC 07/26/2021 0.0  0.0 - 0.2 /100 WBC Final       Assessment & Plan   Problems Addressed this Visit    None     Visit Diagnoses     Severe episode of recurrent major depressive disorder, with psychotic features (HCC)    -  Primary    Relevant Medications    busPIRone (BUSPAR) 15 MG tablet    PARoxetine (PAXIL) 10 MG tablet    OXcarbazepine (TRILEPTAL) 150 MG tablet    hydrOXYzine pamoate (VISTARIL) 25 MG capsule    benztropine (COGENTIN) 0.5 MG tablet    ARIPiprazole (ABILIFY) 20 MG tablet    Generalized anxiety disorder        Relevant Medications    busPIRone (BUSPAR) 15 MG tablet    PARoxetine (PAXIL) 10 MG tablet    OXcarbazepine (TRILEPTAL) 150 MG tablet    hydrOXYzine pamoate (VISTARIL) 25 MG capsule    ARIPiprazole (ABILIFY) 20 MG tablet    Medication management        Relevant Medications    benztropine (COGENTIN) 0.5 MG tablet      Diagnoses       Codes Comments    Severe episode of recurrent major depressive disorder, with psychotic features (HCC)    -  Primary ICD-10-CM: F33.3  ICD-9-CM: 296.34     Generalized anxiety disorder     ICD-10-CM: F41.1  ICD-9-CM: 300.02     Medication management     ICD-10-CM: Z79.899  ICD-9-CM: V58.69         Social History     Tobacco Use   Smoking Status Never Smoker   Smokeless Tobacco Never Used     MAGGIE reviewed and appropriate. Patient counseled on use of controlled substances.       -The benefits of a healthy diet and exercise were discussed with patient, especially the positive effects they have on mental health. Patient encouraged to consider lifestyle modification regarding  diet and exercise patterns  to maximize results of mental health treatment.  -Reviewed previous available documentation  -Reviewed most recent available labs   -Lengthy discussion with patient on the possible side effects of antipsychotic medications including increased cholesterol, increased blood sugar, and possibility of weight gain.  Also discussed the need to monitor lab work associated with this.  The risk of muscle movement disorders with this class of medication was also discussed.  -I've explained to him that drugs of the SSRI class can have side effects such as weight gain, sexual dysfunction, insomnia, headache, nausea. These medications are generally effective at alleviating symptoms of anxiety and/or depression. Let me know if significant side effects do occur.    - Began at 4:05 pm and ended at 4:15 pm    Visit Diagnoses:    ICD-10-CM ICD-9-CM   1. Severe episode of recurrent major depressive disorder, with psychotic features (HCC)  F33.3 296.34   2. Generalized anxiety disorder  F41.1 300.02   3. Medication management  Z79.899 V58.69         GOALS:  Short Term Goals: Patient will be compliant with medication, and patient will have no significant medication related side effects.  Patient will be engaged in psychotherapy as indicated.  Patient will report subjective improvement of symptoms.  Long term goals: To stabilize mood and treat/improve subjective symptoms, the patient will stay out of the hospital, the patient will be at an optimal level of functioning, and the patient will take all medications as prescribed.  The patient/guardian verbalized understanding and agreement with goals that were mutually set.      TREATMENT PLAN: Continue supportive psychotherapy efforts and medications as indicated. Pharmacological and Non-Pharmacological treatment options discussed during today's visit. Patient/Guardian acknowledged and verbally consented with current treatment plan and was educated on the importance of compliance with  treatment and follow-up appointments.      MEDICATION ISSUES:  Discussed medication options and treatment plan of prescribed medication as well as the risks, benefits, any black box warnings, and side effects including potential falls, possible impaired driving, and metabolic adversities among others. Patient is agreeable to call the office with any worsening of symptoms or onset of side effects, or if any concerns or questions arise.  The contact information for the office is made available to the patient. Patient is agreeable to call 911 or go to the nearest ER should they begin having any SI/HI, or if any urgent concerns arise. No medication side effects or related complaints today.     MEDS ORDERED DURING VISIT:  New Medications Ordered This Visit   Medications   • busPIRone (BUSPAR) 15 MG tablet     Sig: Take 1 tablet by mouth 3 (Three) Times a Day.     Dispense:  90 tablet     Refill:  1   • PARoxetine (PAXIL) 10 MG tablet     Sig: Take 1 tablet by mouth Daily.     Dispense:  30 tablet     Refill:  1   • OXcarbazepine (TRILEPTAL) 150 MG tablet     Sig: Take 1 tablet by mouth 2 (Two) Times a Day.     Dispense:  60 tablet     Refill:  1   • hydrOXYzine pamoate (VISTARIL) 25 MG capsule     Sig: Take 1 capsule by mouth 3 (Three) Times a Day As Needed for Anxiety.     Dispense:  90 capsule     Refill:  1   • benztropine (COGENTIN) 0.5 MG tablet     Sig: Take 1 tablet by mouth 2 (Two) Times a Day.     Dispense:  60 tablet     Refill:  1   • ARIPiprazole (ABILIFY) 20 MG tablet     Sig: Take 1.5 tablets by mouth Daily.     Dispense:  45 tablet     Refill:  1       Return in about 2 months (around 9/21/2022) for Recheck.    -Continue aripiprazole 20 mg tablet take 1.5  tablets by mouth daily for depression and anxiety.  -Continue  hydroxyzine 25 mg capsule take 1 capsule by mouth 3 times a day as needed for anxiety.  -Increase buspirone 15 mg tablet take 1 tablet 3 times a day for anxiety.  -Continue Paxil 10 mg tablet  take 1 tablet by mouth daily for depression and anxiety  -Continue Cogentin 0.5 mg tablet take 1 tablet by mouth 2 times a day for trem  -Continue oxcarbazapine 150 mg tablet, take 1 tablet twice a day for mood.       Progress toward goal: Not at goal    Functional Status: Moderate impairment     Prognosis: Guarded with Ongoing Treatment    I spent 30 minutes caring for Tanner on this date of service. This time includes time spent by me in the following activities: preparing for the visit, obtaining and/or reviewing a separately obtained history, performing a medically appropriate examination and/or evaluation, counseling and educating the patient/family/caregiver, ordering medications, tests, or procedures and documenting information in the medical record        This document has been electronically signed by ARSEN Bowden  July 21, 2022 16:16 EDT    Part of this note may be an electronic transcription/translation of spoken language to printed text using the Dragon Dictation System.

## 2022-09-07 NOTE — PROGRESS NOTES
This provider is located at the [facility] (through [facility]), [address], [place], [address] using a secure MyChart Video Visit through [employer]. Patient is being seen remotely via telehealth at their home address in [place], and stated they are in a secure environment for this session. The patient's condition being diagnosed/treated is appropriate for telemedicine. The provider identified herself as well as her credentials.   The patient, and/or patients guardian, consent to be seen remotely, and when consent is given they understand that the consent allows for patient identifiable information to be sent to a third party as needed.   They may refuse to be seen remotely at any time. The electronic data is encrypted and password protected, and the patient and/or guardian has been advised of the potential risks to privacy not withstanding such measures.      Roscoe Bonilla is a 46 y.o. male who presents today for follow up    Chief Complaint:  Anxiety and depression    History of Present Illness:   History of Present Illness   Today is a follow-up visit.  Accompanied by Shanelle .  He thinks his father has 2 additional tumors in his head.   Depression rated 10/10, anxiety rated 10/10, with 10 being the worst.  Sleep is good, getting about 8 hours a night, having nightmares nightly, which is worsened, he thinks it is due to his fathers condition worsening.  Appetite is good.  Denies SI/HI/AVH.  Denies thoughts of self-harm.  Chronic health issues, no acute physical or medical issues today      The following portions of the patient's history were reviewed and updated as appropriate: allergies, current medications, past family history, past medical history, past social history, past surgical history and problem list.      Past Medical History:  Past Medical History:   Diagnosis Date   • Anxiety    • Depression    • Diabetes (HCC)    • GERD  (gastroesophageal reflux disease)    • Gout    • High cholesterol        Social History:  Social History     Socioeconomic History   • Marital status:    Tobacco Use   • Smoking status: Never Smoker   • Smokeless tobacco: Never Used   Vaping Use   • Vaping Use: Never used   Substance and Sexual Activity   • Alcohol use: Never   • Drug use: Never   • Sexual activity: Defer       Family History:  History reviewed. No pertinent family history.    Past Surgical History:  Past Surgical History:   Procedure Laterality Date   • APPENDECTOMY     • CHOLECYSTECTOMY         Problem List:  There is no problem list on file for this patient.       Allergy:   Allergies   Allergen Reactions   • Codeine Nausea Only   • Lopid [Gemfibrozil] Hives   • Metformin Dizziness   • Phenergan [Promethazine] Other (See Comments)     Makes patient sleep too much.     • Sulfa Antibiotics Hives, Itching and Nausea Only        Current Medications:   Current Outpatient Medications   Medication Sig Dispense Refill   • ARIPiprazole (ABILIFY) 20 MG tablet Take 1.5 tablets by mouth Daily. 45 tablet 2   • benztropine (COGENTIN) 0.5 MG tablet Take 1 tablet by mouth 2 (Two) Times a Day. 60 tablet 2   • busPIRone (BUSPAR) 15 MG tablet Take 1 tablet by mouth 3 (Three) Times a Day. 90 tablet 2   • hydrOXYzine pamoate (VISTARIL) 25 MG capsule Take 1 capsule by mouth 3 (Three) Times a Day As Needed for Anxiety. 90 capsule 2   • OXcarbazepine (TRILEPTAL) 150 MG tablet Take 1 tablet by mouth 2 (Two) Times a Day. 60 tablet 2   • PARoxetine (PAXIL) 10 MG tablet Take 1 tablet by mouth Daily. 30 tablet 2   • allopurinol (ZYLOPRIM) 300 MG tablet      • ibuprofen (ADVIL,MOTRIN) 600 MG tablet      • Januvia 100 MG tablet      • Jardiance 25 MG tablet tablet      • Lantus SoloStar 100 UNIT/ML injection pen      • levocetirizine (XYZAL) 5 MG tablet      • omega-3 acid ethyl esters (LOVAZA) 1 g capsule      • omeprazole (priLOSEC) 40 MG capsule      • ondansetron  (Zofran) 4 MG tablet Take 1 tablet by mouth Daily As Needed for Nausea or Vomiting. 20 tablet 0   • simvastatin (ZOCOR) 40 MG tablet      • tamsulosin (FLOMAX) 0.4 MG capsule 24 hr capsule        No current facility-administered medications for this visit.       Review of Symptoms:    Review of Systems   Psychiatric/Behavioral: Positive for depressed mood and stress. Negative for self-injury, sleep disturbance and suicidal ideas. The patient is nervous/anxious.    All other systems reviewed and are negative.        Physical Exam:   There were no vitals taken for this visit.  There is no height or weight on file to calculate BMI.    Appearance:  male appears stated age, no acute distress noted.    Gait, Station, Strength: Steady, posture erect, WNL    Mental Status Exam: 9/19/22  Hygiene:   good  Cooperation:  Cooperative  Eye Contact:  Good  Psychomotor Behavior:  Appropriate  Affect:  Restricted  Mood: depressed  Hopelessness: Denies  Speech:  Normal  Thought Process:  Linear  Thought Content:  Mood congruent  Suicidal:  None  Homicidal:  None  Hallucinations:  None  Delusion:  None  Memory:  Deficits  Orientation:  Person, Place, Time and Situation  Reliability:  fair  Insight:  Poor  Judgement:  Poor  Impulse Control:  Fair  Physical/Medical Issues:  Yes Diabetes, dyslipidemia, joint pain     PHQ-Score Total:  PHQ-9 Total Score:        Lab Results:   No visits with results within 1 Month(s) from this visit.   Latest known visit with results is:   Office Visit on 07/26/2021   Component Date Value Ref Range Status   • External Amphetamine Screen Urine 07/26/2021 Negative   Final   • Amphetamine Cut-Off 07/26/2021 1000ng/ml   Final   • External Benzodiazepine Screen Uri* 07/26/2021 Negative   Final   • Benzodiazipine Cut-Off 07/26/2021 300ng/ml   Final   • External Cocaine Screen Urine 07/26/2021 Negative   Final   • Cocaine Cut-Off 07/26/2021 300ng/ml   Final   • External THC Screen Urine 07/26/2021  Negative   Final   • THC Cut-Off 07/26/2021 50ng/ml   Final   • External Methadone Screen Urine 07/26/2021 Negative   Final   • Methadone Cut-Off 07/26/2021 300ng/ml   Final   • External Methamphetamine Screen Ur* 07/26/2021 Negative   Final   • Methamphetamine Cut-Off 07/26/2021 1000ng/ml   Final   • External Oxycodone Screen Urine 07/26/2021 Negative   Final   • Oxycodone Cut-Off 07/26/2021 100ng/ml   Final   • External Buprenorphine Screen Urine 07/26/2021 Negative   Final   • Buprenorphine Cut-Off 07/26/2021 10ng/ml   Final   • External MDMA 07/26/2021 Negative   Final   • MDMA Cut-Off 07/26/2021 500ng/ml   Final   • External Opiates Screen Urine 07/26/2021 Negative   Final   • Opiates Cut-Off 07/26/2021 300ng/ml   Final   • Glucose 07/26/2021 117 (A) 65 - 99 mg/dL Final   • BUN 07/26/2021 12  6 - 20 mg/dL Final   • Creatinine 07/26/2021 1.07  0.76 - 1.27 mg/dL Final   • Sodium 07/26/2021 143  136 - 145 mmol/L Final   • Potassium 07/26/2021 3.4 (A) 3.5 - 5.2 mmol/L Final   • Chloride 07/26/2021 104  98 - 107 mmol/L Final   • CO2 07/26/2021 27.1  22.0 - 29.0 mmol/L Final   • Calcium 07/26/2021 9.4  8.6 - 10.5 mg/dL Final   • Total Protein 07/26/2021 6.5  6.0 - 8.5 g/dL Final   • Albumin 07/26/2021 4.70  3.50 - 5.20 g/dL Final   • ALT (SGPT) 07/26/2021 44 (A) 1 - 41 U/L Final   • AST (SGOT) 07/26/2021 46 (A) 1 - 40 U/L Final   • Alkaline Phosphatase 07/26/2021 77  39 - 117 U/L Final   • Total Bilirubin 07/26/2021 0.8  0.0 - 1.2 mg/dL Final   • eGFR Non African Amer 07/26/2021 74  >60 mL/min/1.73 Final   • Globulin 07/26/2021 1.8  gm/dL Final   • A/G Ratio 07/26/2021 2.6  g/dL Final   • BUN/Creatinine Ratio 07/26/2021 11.2  7.0 - 25.0 Final   • Anion Gap 07/26/2021 11.9  5.0 - 15.0 mmol/L Final   • Hemoglobin A1C 07/26/2021 7.51 (A) 4.80 - 5.60 % Final   • Total Cholesterol 07/26/2021 166  0 - 200 mg/dL Final   • Triglycerides 07/26/2021 314 (A) 0 - 150 mg/dL Final   • HDL Cholesterol 07/26/2021 38 (A) 40 - 60  mg/dL Final   • LDL Cholesterol  07/26/2021 77  0 - 100 mg/dL Final   • VLDL Cholesterol 07/26/2021 51 (A) 5 - 40 mg/dL Final   • LDL/HDL Ratio 07/26/2021 1.72   Final   • TSH 07/26/2021 3.640  0.270 - 4.200 uIU/mL Final   • Free T4 07/26/2021 1.08  0.93 - 1.70 ng/dL Final   • WBC 07/26/2021 8.15  3.40 - 10.80 10*3/mm3 Final   • RBC 07/26/2021 5.11  4.14 - 5.80 10*6/mm3 Final   • Hemoglobin 07/26/2021 16.2  13.0 - 17.7 g/dL Final   • Hematocrit 07/26/2021 47.9  37.5 - 51.0 % Final   • MCV 07/26/2021 93.7  79.0 - 97.0 fL Final   • MCH 07/26/2021 31.7  26.6 - 33.0 pg Final   • MCHC 07/26/2021 33.8  31.5 - 35.7 g/dL Final   • RDW 07/26/2021 13.2  12.3 - 15.4 % Final   • RDW-SD 07/26/2021 46.1  37.0 - 54.0 fl Final   • MPV 07/26/2021 11.4  6.0 - 12.0 fL Final   • Platelets 07/26/2021 167  140 - 450 10*3/mm3 Final   • Neutrophil % 07/26/2021 58.2  42.7 - 76.0 % Final   • Lymphocyte % 07/26/2021 32.6  19.6 - 45.3 % Final   • Monocyte % 07/26/2021 6.7  5.0 - 12.0 % Final   • Eosinophil % 07/26/2021 1.7  0.3 - 6.2 % Final   • Basophil % 07/26/2021 0.6  0.0 - 1.5 % Final   • Immature Grans % 07/26/2021 0.2  0.0 - 0.5 % Final   • Neutrophils, Absolute 07/26/2021 4.73  1.70 - 7.00 10*3/mm3 Final   • Lymphocytes, Absolute 07/26/2021 2.66  0.70 - 3.10 10*3/mm3 Final   • Monocytes, Absolute 07/26/2021 0.55  0.10 - 0.90 10*3/mm3 Final   • Eosinophils, Absolute 07/26/2021 0.14  0.00 - 0.40 10*3/mm3 Final   • Basophils, Absolute 07/26/2021 0.05  0.00 - 0.20 10*3/mm3 Final   • Immature Grans, Absolute 07/26/2021 0.02  0.00 - 0.05 10*3/mm3 Final   • nRBC 07/26/2021 0.0  0.0 - 0.2 /100 WBC Final       Assessment & Plan   Problems Addressed this Visit    None     Visit Diagnoses     Severe episode of recurrent major depressive disorder, with psychotic features (HCC)    -  Primary    Relevant Medications    PARoxetine (PAXIL) 10 MG tablet    OXcarbazepine (TRILEPTAL) 150 MG tablet    hydrOXYzine pamoate (VISTARIL) 25 MG capsule     busPIRone (BUSPAR) 15 MG tablet    benztropine (COGENTIN) 0.5 MG tablet    ARIPiprazole (ABILIFY) 20 MG tablet    Medication management        Relevant Medications    benztropine (COGENTIN) 0.5 MG tablet    Generalized anxiety disorder        Relevant Medications    PARoxetine (PAXIL) 10 MG tablet    OXcarbazepine (TRILEPTAL) 150 MG tablet    hydrOXYzine pamoate (VISTARIL) 25 MG capsule    busPIRone (BUSPAR) 15 MG tablet    ARIPiprazole (ABILIFY) 20 MG tablet      Diagnoses       Codes Comments    Severe episode of recurrent major depressive disorder, with psychotic features (HCC)    -  Primary ICD-10-CM: F33.3  ICD-9-CM: 296.34     Medication management     ICD-10-CM: Z79.899  ICD-9-CM: V58.69     Generalized anxiety disorder     ICD-10-CM: F41.1  ICD-9-CM: 300.02         Social History     Tobacco Use   Smoking Status Never Smoker   Smokeless Tobacco Never Used     MAGGIE reviewed and appropriate. Patient counseled on use of controlled substances.     -The benefits of a healthy diet and exercise were discussed with patient, especially the positive effects they have on mental health. Patient encouraged to consider lifestyle modification regarding  diet and exercise patterns to maximize results of mental health treatment.  -Reviewed previous available documentation  -Reviewed most recent available labs   -Lengthy discussion with patient on the possible side effects of antipsychotic medications including increased cholesterol, increased blood sugar, and possibility of weight gain.  Also discussed the need to monitor lab work associated with this.  The risk of muscle movement disorders with this class of medication was also discussed.  -The patient is being prescribed a controlled substance as part of the treatment plan. -The patient has been educated of appropriate use of the medication, including risks and side effects such as somnolence, limited ability to drive and/or work or function safely, potential for  dependence, respiratory depression, falls, change in blood pressure, changes in heart rhythm or heart rate, activation of other mental illnesses, and overdose among others. Patient is also informed that the medication is to be used by the patient only, and to avoid any combined use of ETOH, or other substances, with this medication unless prescribed and as directed by a Provider.  The patient verbalized understanding and agreement with this in their own words.  I've explained to him that drugs of the SSRI class can have side effects such as weight gain, sexual dysfunction, insomnia, headache, nausea. These medications are generally effective at alleviating symptoms of anxiety and/or depression. Let me know if significant side effects do occur.        Visit Diagnoses:    ICD-10-CM ICD-9-CM   1. Severe episode of recurrent major depressive disorder, with psychotic features (HCC)  F33.3 296.34   2. Medication management  Z79.899 V58.69   3. Generalized anxiety disorder  F41.1 300.02       TREATMENT PLAN/GOALS: Continue supportive psychotherapy efforts and medications as indicated. Treatment and medication options discussed during today's visit. Patient acknowledged and verbally consented to continue with current treatment plan and was educated on the importance of compliance with treatment and follow-up appointments.    MEDICATION ISSUES:    Discussed medication options and treatment plan of prescribed medication as well as the risks, benefits, and side effects including potential falls, possible impaired driving and metabolic adversities among others. Patient is agreeable to call the office with any worsening of symptoms or onset of side effects. Patient is agreeable to call 911 or go to the nearest ER should he/she begin having SI/HI.     MEDS ORDERED DURING VISIT:  New Medications Ordered This Visit   Medications   • PARoxetine (PAXIL) 10 MG tablet     Sig: Take 1 tablet by mouth Daily.     Dispense:  30 tablet      Refill:  2   • OXcarbazepine (TRILEPTAL) 150 MG tablet     Sig: Take 1 tablet by mouth 2 (Two) Times a Day.     Dispense:  60 tablet     Refill:  2   • hydrOXYzine pamoate (VISTARIL) 25 MG capsule     Sig: Take 1 capsule by mouth 3 (Three) Times a Day As Needed for Anxiety.     Dispense:  90 capsule     Refill:  2   • busPIRone (BUSPAR) 15 MG tablet     Sig: Take 1 tablet by mouth 3 (Three) Times a Day.     Dispense:  90 tablet     Refill:  2   • benztropine (COGENTIN) 0.5 MG tablet     Sig: Take 1 tablet by mouth 2 (Two) Times a Day.     Dispense:  60 tablet     Refill:  2   • ARIPiprazole (ABILIFY) 20 MG tablet     Sig: Take 1.5 tablets by mouth Daily.     Dispense:  45 tablet     Refill:  2       Return in about 3 months (around 12/19/2022).     -Continue aripiprazole 20 mg tablet take 1.5  tablets by mouth daily for depression and anxiety.  -Continue  hydroxyzine 25 mg capsule take 1 capsule by mouth 3 times a day as needed for anxiety.  -Continue buspirone 15 mg tablet take 1 tablet 3 times a day for anxiety.  -Continue Paxil 10 mg tablet take 1 tablet by mouth daily for depression and anxiety  -Continue Cogentin 0.5 mg tablet take 1 tablet by mouth 2 times a day for trem  -Continue oxcarbazapine 150 mg tablet, take 1 tablet twice a day for mood.      I spent 30 minutes caring for Tanner on this date of service. This time includes time spent by me in the following activities: preparing for the visit, obtaining and/or reviewing a separately obtained history, performing a medically appropriate examination and/or evaluation, counseling and educating the patient/family/caregiver, ordering medications, tests, or procedures and documenting information in the medical record    Interactive Complexity Yes If yes, due to:  Third-Party Involvement Other:              This document has been electronically signed by ARSEN Bowden  September 19, 2022 15:00 EDT    Part of this note may be an electronic  transcription/translation of spoken language to printed text using the Dragon Dictation System.

## 2022-09-19 ENCOUNTER — TELEMEDICINE (OUTPATIENT)
Dept: PSYCHIATRY | Facility: CLINIC | Age: 47
End: 2022-09-19

## 2022-09-19 DIAGNOSIS — Z79.899 MEDICATION MANAGEMENT: ICD-10-CM

## 2022-09-19 DIAGNOSIS — F33.3 SEVERE EPISODE OF RECURRENT MAJOR DEPRESSIVE DISORDER, WITH PSYCHOTIC FEATURES: Primary | ICD-10-CM

## 2022-09-19 DIAGNOSIS — F41.1 GENERALIZED ANXIETY DISORDER: ICD-10-CM

## 2022-09-19 PROCEDURE — 99214 OFFICE O/P EST MOD 30 MIN: CPT | Performed by: NURSE PRACTITIONER

## 2022-09-19 RX ORDER — PAROXETINE 10 MG/1
10 TABLET, FILM COATED ORAL DAILY
Qty: 30 TABLET | Refills: 2 | Status: SHIPPED | OUTPATIENT
Start: 2022-09-19 | End: 2023-02-24 | Stop reason: SDUPTHER

## 2022-09-19 RX ORDER — HYDROXYZINE PAMOATE 25 MG/1
25 CAPSULE ORAL 3 TIMES DAILY PRN
Qty: 90 CAPSULE | Refills: 2 | Status: SHIPPED | OUTPATIENT
Start: 2022-09-19 | End: 2023-02-24 | Stop reason: SDUPTHER

## 2022-09-19 RX ORDER — ARIPIPRAZOLE 20 MG/1
30 TABLET ORAL DAILY
Qty: 45 TABLET | Refills: 2 | Status: SHIPPED | OUTPATIENT
Start: 2022-09-19 | End: 2023-02-24 | Stop reason: SDUPTHER

## 2022-09-19 RX ORDER — OXCARBAZEPINE 150 MG/1
150 TABLET, FILM COATED ORAL 2 TIMES DAILY
Qty: 60 TABLET | Refills: 2 | Status: SHIPPED | OUTPATIENT
Start: 2022-09-19 | End: 2023-02-24 | Stop reason: SDUPTHER

## 2022-09-19 RX ORDER — BENZTROPINE MESYLATE 0.5 MG/1
0.5 TABLET ORAL 2 TIMES DAILY
Qty: 60 TABLET | Refills: 2 | Status: SHIPPED | OUTPATIENT
Start: 2022-09-19 | End: 2023-02-24 | Stop reason: SDUPTHER

## 2022-09-19 RX ORDER — BUSPIRONE HYDROCHLORIDE 15 MG/1
15 TABLET ORAL 3 TIMES DAILY
Qty: 90 TABLET | Refills: 2 | Status: SHIPPED | OUTPATIENT
Start: 2022-09-19 | End: 2023-02-24 | Stop reason: SDUPTHER

## 2023-01-01 NOTE — TELEPHONE ENCOUNTER
LEFT VM FOR PT TO CALL OFFICE SO I CAN INFORM THAT MEDS WERE SENT.  
PT REQUESTING REFILLS. NEXT APPT WITH YOU IS 08/30/2021. THANK YOU!  
2023/A positive

## 2023-02-16 NOTE — PROGRESS NOTES
This provider is located at the [facility] (through [facility]), [address], [place], [address] using a secure MyChart Video Visit through [employer]. Patient is being seen remotely via telehealth at their home address in [place], and stated they are in a secure environment for this session. The patient's condition being diagnosed/treated is appropriate for telemedicine. The provider identified herself as well as her credentials.   The patient, and/or patients guardian, consent to be seen remotely, and when consent is given they understand that the consent allows for patient identifiable information to be sent to a third party as needed.   They may refuse to be seen remotely at any time. The electronic data is encrypted and password protected, and the patient and/or guardian has been advised of the potential risks to privacy not withstanding such measures.      Roscoe Bonilla is a 47 y.o. male who presents today for follow up    Chief Complaint:  Anxiety and depression    History of Present Illness:   History of Present Illness  Today is a follow-up visit.  Accompanied by Patria, . He states his father passed away on 1/3/23.  He is having a difficult time dealing with the loss.  Depression rated 10/10, anxiety rated 10/10, with 10 being the worst.  Sleep is poor, getting about 6 hours a night, having nightmares nightly, sleep has worsened since his fathers death. He has been dreaming about his father.  Appetite is good.  Denies SI/HI.  Denies thoughts of self-harm. He states he sees and hears his father when he goes to bed and during the day. He states his heart is broken due to the loss.  Chronic health issues, no acute physical or medical issues today           The following portions of the patient's history were reviewed and updated as appropriate: allergies, current medications, past family history, past medical history, past social history,  past surgical history and problem list.      Past Medical History:  Past Medical History:   Diagnosis Date   • Anxiety    • Depression    • Diabetes (HCC)    • GERD (gastroesophageal reflux disease)    • Gout    • High cholesterol        Social History:  Social History     Socioeconomic History   • Marital status:    Tobacco Use   • Smoking status: Never   • Smokeless tobacco: Never   Vaping Use   • Vaping Use: Never used   Substance and Sexual Activity   • Alcohol use: Never   • Drug use: Never   • Sexual activity: Defer       Family History:  History reviewed. No pertinent family history.    Past Surgical History:  Past Surgical History:   Procedure Laterality Date   • APPENDECTOMY     • CHOLECYSTECTOMY         Problem List:  There is no problem list on file for this patient.       Allergy:   Allergies   Allergen Reactions   • Codeine Nausea Only   • Lopid [Gemfibrozil] Hives   • Metformin Dizziness   • Phenergan [Promethazine] Other (See Comments)     Makes patient sleep too much.     • Sulfa Antibiotics Hives, Itching and Nausea Only        Current Medications:   Current Outpatient Medications   Medication Sig Dispense Refill   • ARIPiprazole (ABILIFY) 20 MG tablet Take 1.5 tablets by mouth Daily. 45 tablet 2   • benztropine (COGENTIN) 0.5 MG tablet Take 1 tablet by mouth 2 (Two) Times a Day. 60 tablet 2   • busPIRone (BUSPAR) 15 MG tablet Take 1 tablet by mouth 3 (Three) Times a Day. 90 tablet 2   • hydrOXYzine pamoate (VISTARIL) 25 MG capsule Take 1 capsule by mouth 3 (Three) Times a Day As Needed for Anxiety. 90 capsule 2   • OXcarbazepine (TRILEPTAL) 150 MG tablet Take 1 tablet by mouth 2 (Two) Times a Day. 60 tablet 2   • PARoxetine (PAXIL) 10 MG tablet Take 1 tablet by mouth Daily. 30 tablet 2   • allopurinol (ZYLOPRIM) 300 MG tablet      • ibuprofen (ADVIL,MOTRIN) 600 MG tablet      • Januvia 100 MG tablet      • Jardiance 25 MG tablet tablet      • Lantus SoloStar 100 UNIT/ML injection pen      •  levocetirizine (XYZAL) 5 MG tablet      • LORazepam (Ativan) 0.5 MG tablet Take 1 tablet up to twice a day as needed for anxiety 60 tablet 0   • omega-3 acid ethyl esters (LOVAZA) 1 g capsule      • omeprazole (priLOSEC) 40 MG capsule      • ondansetron (Zofran) 4 MG tablet Take 1 tablet by mouth Daily As Needed for Nausea or Vomiting. 20 tablet 0   • simvastatin (ZOCOR) 40 MG tablet      • tamsulosin (FLOMAX) 0.4 MG capsule 24 hr capsule        No current facility-administered medications for this visit.       Review of Symptoms:    Review of Systems   Psychiatric/Behavioral: Positive for hallucinations, sleep disturbance, depressed mood and stress. Negative for self-injury and suicidal ideas. The patient is nervous/anxious.    All other systems reviewed and are negative.        Physical Exam:   There were no vitals taken for this visit.  There is no height or weight on file to calculate BMI.    Appearance:  male appears stated age, no acute distress noted.    Gait, Station, Strength: Steady, posture erect, WNL    Mental Status Exam: 2/24/23  Hygiene:   good  Cooperation:  Cooperative  Eye Contact:  Good  Psychomotor Behavior:  Appropriate  Affect:  Restricted  Mood: depressed  Hopelessness: Denies  Speech:  Normal  Thought Process:  Linear  Thought Content:  Mood congruent  Suicidal:  None  Homicidal:  None  Hallucinations:  None  Delusion:  None  Memory:  Deficits  Orientation:  Person, Place, Time and Situation  Reliability:  fair  Insight:  Poor  Judgement:  Poor  Impulse Control:  Fair       PHQ-Score Total:  PHQ-9 Total Score:        Lab Results:   No visits with results within 1 Month(s) from this visit.   Latest known visit with results is:   Office Visit on 07/26/2021   Component Date Value Ref Range Status   • External Amphetamine Screen Urine 07/26/2021 Negative   Final   • Amphetamine Cut-Off 07/26/2021 1000ng/ml   Final   • External Benzodiazepine Screen Uri* 07/26/2021 Negative   Final   •  Benzodiazipine Cut-Off 07/26/2021 300ng/ml   Final   • External Cocaine Screen Urine 07/26/2021 Negative   Final   • Cocaine Cut-Off 07/26/2021 300ng/ml   Final   • External THC Screen Urine 07/26/2021 Negative   Final   • THC Cut-Off 07/26/2021 50ng/ml   Final   • External Methadone Screen Urine 07/26/2021 Negative   Final   • Methadone Cut-Off 07/26/2021 300ng/ml   Final   • External Methamphetamine Screen Ur* 07/26/2021 Negative   Final   • Methamphetamine Cut-Off 07/26/2021 1000ng/ml   Final   • External Oxycodone Screen Urine 07/26/2021 Negative   Final   • Oxycodone Cut-Off 07/26/2021 100ng/ml   Final   • External Buprenorphine Screen Urine 07/26/2021 Negative   Final   • Buprenorphine Cut-Off 07/26/2021 10ng/ml   Final   • External MDMA 07/26/2021 Negative   Final   • MDMA Cut-Off 07/26/2021 500ng/ml   Final   • External Opiates Screen Urine 07/26/2021 Negative   Final   • Opiates Cut-Off 07/26/2021 300ng/ml   Final   • Glucose 07/26/2021 117 (H)  65 - 99 mg/dL Final   • BUN 07/26/2021 12  6 - 20 mg/dL Final   • Creatinine 07/26/2021 1.07  0.76 - 1.27 mg/dL Final   • Sodium 07/26/2021 143  136 - 145 mmol/L Final   • Potassium 07/26/2021 3.4 (L)  3.5 - 5.2 mmol/L Final   • Chloride 07/26/2021 104  98 - 107 mmol/L Final   • CO2 07/26/2021 27.1  22.0 - 29.0 mmol/L Final   • Calcium 07/26/2021 9.4  8.6 - 10.5 mg/dL Final   • Total Protein 07/26/2021 6.5  6.0 - 8.5 g/dL Final   • Albumin 07/26/2021 4.70  3.50 - 5.20 g/dL Final   • ALT (SGPT) 07/26/2021 44 (H)  1 - 41 U/L Final   • AST (SGOT) 07/26/2021 46 (H)  1 - 40 U/L Final   • Alkaline Phosphatase 07/26/2021 77  39 - 117 U/L Final   • Total Bilirubin 07/26/2021 0.8  0.0 - 1.2 mg/dL Final   • eGFR Non African Amer 07/26/2021 74  >60 mL/min/1.73 Final   • Globulin 07/26/2021 1.8  gm/dL Final   • A/G Ratio 07/26/2021 2.6  g/dL Final   • BUN/Creatinine Ratio 07/26/2021 11.2  7.0 - 25.0 Final   • Anion Gap 07/26/2021 11.9  5.0 - 15.0 mmol/L Final   • Hemoglobin A1C  07/26/2021 7.51 (H)  4.80 - 5.60 % Final   • Total Cholesterol 07/26/2021 166  0 - 200 mg/dL Final   • Triglycerides 07/26/2021 314 (H)  0 - 150 mg/dL Final   • HDL Cholesterol 07/26/2021 38 (L)  40 - 60 mg/dL Final   • LDL Cholesterol  07/26/2021 77  0 - 100 mg/dL Final   • VLDL Cholesterol 07/26/2021 51 (H)  5 - 40 mg/dL Final   • LDL/HDL Ratio 07/26/2021 1.72   Final   • TSH 07/26/2021 3.640  0.270 - 4.200 uIU/mL Final   • Free T4 07/26/2021 1.08  0.93 - 1.70 ng/dL Final   • WBC 07/26/2021 8.15  3.40 - 10.80 10*3/mm3 Final   • RBC 07/26/2021 5.11  4.14 - 5.80 10*6/mm3 Final   • Hemoglobin 07/26/2021 16.2  13.0 - 17.7 g/dL Final   • Hematocrit 07/26/2021 47.9  37.5 - 51.0 % Final   • MCV 07/26/2021 93.7  79.0 - 97.0 fL Final   • MCH 07/26/2021 31.7  26.6 - 33.0 pg Final   • MCHC 07/26/2021 33.8  31.5 - 35.7 g/dL Final   • RDW 07/26/2021 13.2  12.3 - 15.4 % Final   • RDW-SD 07/26/2021 46.1  37.0 - 54.0 fl Final   • MPV 07/26/2021 11.4  6.0 - 12.0 fL Final   • Platelets 07/26/2021 167  140 - 450 10*3/mm3 Final   • Neutrophil % 07/26/2021 58.2  42.7 - 76.0 % Final   • Lymphocyte % 07/26/2021 32.6  19.6 - 45.3 % Final   • Monocyte % 07/26/2021 6.7  5.0 - 12.0 % Final   • Eosinophil % 07/26/2021 1.7  0.3 - 6.2 % Final   • Basophil % 07/26/2021 0.6  0.0 - 1.5 % Final   • Immature Grans % 07/26/2021 0.2  0.0 - 0.5 % Final   • Neutrophils, Absolute 07/26/2021 4.73  1.70 - 7.00 10*3/mm3 Final   • Lymphocytes, Absolute 07/26/2021 2.66  0.70 - 3.10 10*3/mm3 Final   • Monocytes, Absolute 07/26/2021 0.55  0.10 - 0.90 10*3/mm3 Final   • Eosinophils, Absolute 07/26/2021 0.14  0.00 - 0.40 10*3/mm3 Final   • Basophils, Absolute 07/26/2021 0.05  0.00 - 0.20 10*3/mm3 Final   • Immature Grans, Absolute 07/26/2021 0.02  0.00 - 0.05 10*3/mm3 Final   • nRBC 07/26/2021 0.0  0.0 - 0.2 /100 WBC Final       Assessment & Plan   Problems Addressed this Visit    None  Visit Diagnoses     Severe episode of recurrent major depressive disorder,  with psychotic features (HCC)    -  Primary    Relevant Medications    PARoxetine (PAXIL) 10 MG tablet    OXcarbazepine (TRILEPTAL) 150 MG tablet    hydrOXYzine pamoate (VISTARIL) 25 MG capsule    busPIRone (BUSPAR) 15 MG tablet    benztropine (COGENTIN) 0.5 MG tablet    ARIPiprazole (ABILIFY) 20 MG tablet    LORazepam (Ativan) 0.5 MG tablet    Generalized anxiety disorder        Relevant Medications    PARoxetine (PAXIL) 10 MG tablet    OXcarbazepine (TRILEPTAL) 150 MG tablet    hydrOXYzine pamoate (VISTARIL) 25 MG capsule    busPIRone (BUSPAR) 15 MG tablet    ARIPiprazole (ABILIFY) 20 MG tablet    LORazepam (Ativan) 0.5 MG tablet    Medication management        Relevant Medications    benztropine (COGENTIN) 0.5 MG tablet      Diagnoses       Codes Comments    Severe episode of recurrent major depressive disorder, with psychotic features (HCC)    -  Primary ICD-10-CM: F33.3  ICD-9-CM: 296.34     Generalized anxiety disorder     ICD-10-CM: F41.1  ICD-9-CM: 300.02     Medication management     ICD-10-CM: Z79.899  ICD-9-CM: V58.69         Social History     Tobacco Use   Smoking Status Never   Smokeless Tobacco Never     MAGGIE reviewed and appropriate. Patient counseled on use of controlled substances.     -The benefits of a healthy diet and exercise were discussed with patient, especially the positive effects they have on mental health. Patient encouraged to consider lifestyle modification regarding  diet and exercise patterns to maximize results of mental health treatment.  -Reviewed previous available documentation  -Reviewed most recent available labs   -Lengthy discussion with patient on the possible side effects of antipsychotic medications including increased cholesterol, increased blood sugar, and possibility of weight gain.  Also discussed the need to monitor lab work associated with this.  The risk of muscle movement disorders with this class of medication was also discussed.  I've explained to him that  drugs of the SSRI class can have side effects such as weight gain, sexual dysfunction, insomnia, headache, nausea. These medications are generally effective at alleviating symptoms of anxiety and/or depression. Let me know if significant side effects do occur.    -The patient is being prescribed a controlled substance as part of the treatment plan. -The patient has been educated of appropriate use of the medication, including risks and side effects such as somnolence, limited ability to drive and/or work or function safely, potential for dependence, respiratory depression, falls, change in blood pressure, changes in heart rhythm or heart rate, activation of other mental illnesses, and overdose among others. Patient is also informed that the medication is to be used by the patient only, and to avoid any combined use of ETOH, or other substances, with this medication unless prescribed and as directed by a Provider.  The patient verbalized understanding and agreement with this in their own words.      Visit Diagnoses:    ICD-10-CM ICD-9-CM   1. Severe episode of recurrent major depressive disorder, with psychotic features (HCC)  F33.3 296.34   2. Generalized anxiety disorder  F41.1 300.02   3. Medication management  Z79.899 V58.69       TREATMENT PLAN/GOALS: Continue supportive psychotherapy efforts and medications as indicated. Treatment and medication options discussed during today's visit. Patient acknowledged and verbally consented to continue with current treatment plan and was educated on the importance of compliance with treatment and follow-up appointments.    MEDICATION ISSUES:    Discussed medication options and treatment plan of prescribed medication as well as the risks, benefits, and side effects including potential falls, possible impaired driving and metabolic adversities among others. Patient is agreeable to call the office with any worsening of symptoms or onset of side effects. Patient is agreeable to  call 911 or go to the nearest ER should he/she begin having SI/HI.     MEDS ORDERED DURING VISIT:  New Medications Ordered This Visit   Medications   • PARoxetine (PAXIL) 10 MG tablet     Sig: Take 1 tablet by mouth Daily.     Dispense:  30 tablet     Refill:  2   • OXcarbazepine (TRILEPTAL) 150 MG tablet     Sig: Take 1 tablet by mouth 2 (Two) Times a Day.     Dispense:  60 tablet     Refill:  2   • hydrOXYzine pamoate (VISTARIL) 25 MG capsule     Sig: Take 1 capsule by mouth 3 (Three) Times a Day As Needed for Anxiety.     Dispense:  90 capsule     Refill:  2   • busPIRone (BUSPAR) 15 MG tablet     Sig: Take 1 tablet by mouth 3 (Three) Times a Day.     Dispense:  90 tablet     Refill:  2   • benztropine (COGENTIN) 0.5 MG tablet     Sig: Take 1 tablet by mouth 2 (Two) Times a Day.     Dispense:  60 tablet     Refill:  2   • ARIPiprazole (ABILIFY) 20 MG tablet     Sig: Take 1.5 tablets by mouth Daily.     Dispense:  45 tablet     Refill:  2   • LORazepam (Ativan) 0.5 MG tablet     Sig: Take 1 tablet up to twice a day as needed for anxiety     Dispense:  60 tablet     Refill:  0       Return in about 3 months (around 5/24/2023).     -Continue aripiprazole 20 mg tablet take 1.5  tablets by mouth daily for depression and anxiety.  -Continue  hydroxyzine 25 mg capsule take 1 capsule by mouth 3 times a day as needed for anxiety.  -Continue buspirone 15 mg tablet take 1 tablet 3 times a day for anxiety.  -Continue Paxil 10 mg tablet take 1 tablet by mouth daily for depression and anxiety  -Continue Cogentin 0.5 mg tablet take 1 tablet by mouth 2 times a day for trem  -Continue oxcarbazapine 150 mg tablet, take 1 tablet twice a day for mood.    -Add Ativan 0.5 mg tablet, take 1 tablet up to twice a day for anxiety, encouraged him to only take if absolutely necessary, this will be a short term prescription.      I spent 30 minutes caring for Tanner on this date of service. This time includes time spent by me in the following  activities: preparing for the visit, obtaining and/or reviewing a separately obtained history, performing a medically appropriate examination and/or evaluation, counseling and educating the patient/family/caregiver, ordering medications, tests, or procedures and documenting information in the medical record    Interactive Complexity Yes If yes, due to:  Third-Party Involvement Other:              This document has been electronically signed by ARSEN Martin  February 24, 2023 14:14 EST    Part of this note may be an electronic transcription/translation of spoken language to printed text using the Dragon Dictation System.

## 2023-02-24 ENCOUNTER — TELEMEDICINE (OUTPATIENT)
Dept: PSYCHIATRY | Facility: CLINIC | Age: 48
End: 2023-02-24
Payer: COMMERCIAL

## 2023-02-24 DIAGNOSIS — Z79.899 MEDICATION MANAGEMENT: ICD-10-CM

## 2023-02-24 DIAGNOSIS — F33.3 SEVERE EPISODE OF RECURRENT MAJOR DEPRESSIVE DISORDER, WITH PSYCHOTIC FEATURES: Primary | ICD-10-CM

## 2023-02-24 DIAGNOSIS — F41.1 GENERALIZED ANXIETY DISORDER: ICD-10-CM

## 2023-02-24 PROCEDURE — 99214 OFFICE O/P EST MOD 30 MIN: CPT | Performed by: NURSE PRACTITIONER

## 2023-02-24 RX ORDER — HYDROXYZINE PAMOATE 25 MG/1
25 CAPSULE ORAL 3 TIMES DAILY PRN
Qty: 90 CAPSULE | Refills: 2 | Status: SHIPPED | OUTPATIENT
Start: 2023-02-24

## 2023-02-24 RX ORDER — ARIPIPRAZOLE 20 MG/1
30 TABLET ORAL DAILY
Qty: 45 TABLET | Refills: 2 | Status: SHIPPED | OUTPATIENT
Start: 2023-02-24

## 2023-02-24 RX ORDER — BENZTROPINE MESYLATE 0.5 MG/1
0.5 TABLET ORAL 2 TIMES DAILY
Qty: 60 TABLET | Refills: 2 | Status: SHIPPED | OUTPATIENT
Start: 2023-02-24

## 2023-02-24 RX ORDER — PAROXETINE 10 MG/1
10 TABLET, FILM COATED ORAL DAILY
Qty: 30 TABLET | Refills: 2 | Status: SHIPPED | OUTPATIENT
Start: 2023-02-24

## 2023-02-24 RX ORDER — LORAZEPAM 0.5 MG/1
TABLET ORAL
Qty: 60 TABLET | Refills: 0 | Status: SHIPPED | OUTPATIENT
Start: 2023-02-24

## 2023-02-24 RX ORDER — BUSPIRONE HYDROCHLORIDE 15 MG/1
15 TABLET ORAL 3 TIMES DAILY
Qty: 90 TABLET | Refills: 2 | Status: SHIPPED | OUTPATIENT
Start: 2023-02-24

## 2023-02-24 RX ORDER — OXCARBAZEPINE 150 MG/1
150 TABLET, FILM COATED ORAL 2 TIMES DAILY
Qty: 60 TABLET | Refills: 2 | Status: SHIPPED | OUTPATIENT
Start: 2023-02-24

## 2023-05-18 NOTE — PROGRESS NOTES
"This provider is located at the [facility] (through [facility]), [address], [place], [address] using a secure fav.or.ithart Video Visit through [employer]. Patient is being seen remotely via telehealth at their home address in [place], and stated they are in a secure environment for this session. The patient's condition being diagnosed/treated is appropriate for telemedicine. The provider identified herself as well as her credentials.   The patient, and/or patients guardian, consent to be seen remotely, and when consent is given they understand that the consent allows for patient identifiable information to be sent to a third party as needed.   They may refuse to be seen remotely at any time. The electronic data is encrypted and password protected, and the patient and/or guardian has been advised of the potential risks to privacy not withstanding such measures.      Roscoe Bonilla is a 47 y.o. male who presents today for follow up    Chief Complaint:  Anxiety and depression    History of Present Illness:   History of Present Illness  Today is a follow-up visit.  Accompanied by Shanelle, .  He states he still shaking, agitation and nightmares.  Continues to cry when thinking about his loss of his father.   Depression rated 10/10, anxiety rated 10/10, with 10 being the worst.  Sleep is poor, getting about 4 hours a night, having nightmares nightly, He thinks about his father frequently, he has difficulty sleeping  Appetite is good..  Denies SI/HI.  He continues to hear and see his father. He states \"it tore\" him into when his father .  Chronic health issues, no acute physical or medical issues today.       The following portions of the patient's history were reviewed and updated as appropriate: allergies, current medications, past family history, past medical history, past social history, past surgical history and problem list.      Past Medical " History:  Past Medical History:   Diagnosis Date   • Anxiety    • Depression    • Diabetes    • GERD (gastroesophageal reflux disease)    • Gout    • High cholesterol        Social History:  Social History     Socioeconomic History   • Marital status:    Tobacco Use   • Smoking status: Never   • Smokeless tobacco: Never   Vaping Use   • Vaping Use: Never used   Substance and Sexual Activity   • Alcohol use: Never   • Drug use: Never   • Sexual activity: Defer       Family History:  No family history on file.    Past Surgical History:  Past Surgical History:   Procedure Laterality Date   • APPENDECTOMY     • CHOLECYSTECTOMY         Problem List:  There is no problem list on file for this patient.       Allergy:   Allergies   Allergen Reactions   • Codeine Nausea Only   • Lopid [Gemfibrozil] Hives   • Metformin Dizziness   • Phenergan [Promethazine] Other (See Comments)     Makes patient sleep too much.     • Sulfa Antibiotics Hives, Itching and Nausea Only        Current Medications:   Current Outpatient Medications   Medication Sig Dispense Refill   • ARIPiprazole (ABILIFY) 20 MG tablet Take 1.5 tablets by mouth Daily. 45 tablet 2   • benztropine (COGENTIN) 1 MG tablet Take 1 tablet by mouth 2 (Two) Times a Day. 60 tablet 2   • busPIRone (BUSPAR) 15 MG tablet Take 1 tablet by mouth 3 (Three) Times a Day. 90 tablet 2   • hydrOXYzine pamoate (VISTARIL) 25 MG capsule Take 1 capsule by mouth 3 (Three) Times a Day As Needed for Anxiety. 90 capsule 2   • OXcarbazepine (TRILEPTAL) 150 MG tablet Take 1 tablet by mouth 2 (Two) Times a Day. 60 tablet 2   • PARoxetine (PAXIL) 10 MG tablet Take 1 tablet by mouth Daily. 30 tablet 2   • allopurinol (ZYLOPRIM) 300 MG tablet      • ibuprofen (ADVIL,MOTRIN) 600 MG tablet      • Januvia 100 MG tablet      • Jardiance 25 MG tablet tablet      • Lantus SoloStar 100 UNIT/ML injection pen      • levocetirizine (XYZAL) 5 MG tablet      • LORazepam (Ativan) 0.5 MG tablet Take 1  tablet up to twice a day as needed for anxiety 60 tablet 0   • omega-3 acid ethyl esters (LOVAZA) 1 g capsule      • omeprazole (priLOSEC) 40 MG capsule      • simvastatin (ZOCOR) 40 MG tablet      • tamsulosin (FLOMAX) 0.4 MG capsule 24 hr capsule        No current facility-administered medications for this visit.       Review of Symptoms:    Review of Systems   Psychiatric/Behavioral: Positive for dysphoric mood, hallucinations, sleep disturbance, depressed mood and stress. Negative for self-injury and suicidal ideas. The patient is nervous/anxious.    All other systems reviewed and are negative.        Physical Exam:   There were no vitals taken for this visit.  There is no height or weight on file to calculate BMI.    Appearance:  male appears stated age, no acute distress noted.    Gait, Station, Strength: Steady, posture erect, WNL    Mental Status Exam: 5/24/23  Hygiene:   good  Cooperation:  Cooperative  Eye Contact:  Good  Psychomotor Behavior:  Appropriate  Affect:  Restricted  Mood: depressed  Hopelessness: Denies  Speech:  Normal  Thought Process:  Linear  Thought Content:  Mood congruent  Suicidal:  None  Homicidal:  None  Hallucinations:  None  Delusion:  None  Memory:  Deficits  Orientation:  Person, Place, Time and Situation  Reliability:  fair  Insight:  Poor  Judgement:  Poor  Impulse Control:  Fair     PHQ-Score Total:  PHQ-9 Total Score:        Lab Results:   No visits with results within 1 Month(s) from this visit.   Latest known visit with results is:   Office Visit on 07/26/2021   Component Date Value Ref Range Status   • External Amphetamine Screen Urine 07/26/2021 Negative   Final   • Amphetamine Cut-Off 07/26/2021 1000ng/ml   Final   • External Benzodiazepine Screen Uri* 07/26/2021 Negative   Final   • Benzodiazipine Cut-Off 07/26/2021 300ng/ml   Final   • External Cocaine Screen Urine 07/26/2021 Negative   Final   • Cocaine Cut-Off 07/26/2021 300ng/ml   Final   • External THC  Screen Urine 07/26/2021 Negative   Final   • THC Cut-Off 07/26/2021 50ng/ml   Final   • External Methadone Screen Urine 07/26/2021 Negative   Final   • Methadone Cut-Off 07/26/2021 300ng/ml   Final   • External Methamphetamine Screen Ur* 07/26/2021 Negative   Final   • Methamphetamine Cut-Off 07/26/2021 1000ng/ml   Final   • External Oxycodone Screen Urine 07/26/2021 Negative   Final   • Oxycodone Cut-Off 07/26/2021 100ng/ml   Final   • External Buprenorphine Screen Urine 07/26/2021 Negative   Final   • Buprenorphine Cut-Off 07/26/2021 10ng/ml   Final   • External MDMA 07/26/2021 Negative   Final   • MDMA Cut-Off 07/26/2021 500ng/ml   Final   • External Opiates Screen Urine 07/26/2021 Negative   Final   • Opiates Cut-Off 07/26/2021 300ng/ml   Final   • Glucose 07/26/2021 117 (H)  65 - 99 mg/dL Final   • BUN 07/26/2021 12  6 - 20 mg/dL Final   • Creatinine 07/26/2021 1.07  0.76 - 1.27 mg/dL Final   • Sodium 07/26/2021 143  136 - 145 mmol/L Final   • Potassium 07/26/2021 3.4 (L)  3.5 - 5.2 mmol/L Final   • Chloride 07/26/2021 104  98 - 107 mmol/L Final   • CO2 07/26/2021 27.1  22.0 - 29.0 mmol/L Final   • Calcium 07/26/2021 9.4  8.6 - 10.5 mg/dL Final   • Total Protein 07/26/2021 6.5  6.0 - 8.5 g/dL Final   • Albumin 07/26/2021 4.70  3.50 - 5.20 g/dL Final   • ALT (SGPT) 07/26/2021 44 (H)  1 - 41 U/L Final   • AST (SGOT) 07/26/2021 46 (H)  1 - 40 U/L Final   • Alkaline Phosphatase 07/26/2021 77  39 - 117 U/L Final   • Total Bilirubin 07/26/2021 0.8  0.0 - 1.2 mg/dL Final   • eGFR Non African Amer 07/26/2021 74  >60 mL/min/1.73 Final   • Globulin 07/26/2021 1.8  gm/dL Final   • A/G Ratio 07/26/2021 2.6  g/dL Final   • BUN/Creatinine Ratio 07/26/2021 11.2  7.0 - 25.0 Final   • Anion Gap 07/26/2021 11.9  5.0 - 15.0 mmol/L Final   • Hemoglobin A1C 07/26/2021 7.51 (H)  4.80 - 5.60 % Final   • Total Cholesterol 07/26/2021 166  0 - 200 mg/dL Final   • Triglycerides 07/26/2021 314 (H)  0 - 150 mg/dL Final   • HDL Cholesterol  07/26/2021 38 (L)  40 - 60 mg/dL Final   • LDL Cholesterol  07/26/2021 77  0 - 100 mg/dL Final   • VLDL Cholesterol 07/26/2021 51 (H)  5 - 40 mg/dL Final   • LDL/HDL Ratio 07/26/2021 1.72   Final   • TSH 07/26/2021 3.640  0.270 - 4.200 uIU/mL Final   • Free T4 07/26/2021 1.08  0.93 - 1.70 ng/dL Final   • WBC 07/26/2021 8.15  3.40 - 10.80 10*3/mm3 Final   • RBC 07/26/2021 5.11  4.14 - 5.80 10*6/mm3 Final   • Hemoglobin 07/26/2021 16.2  13.0 - 17.7 g/dL Final   • Hematocrit 07/26/2021 47.9  37.5 - 51.0 % Final   • MCV 07/26/2021 93.7  79.0 - 97.0 fL Final   • MCH 07/26/2021 31.7  26.6 - 33.0 pg Final   • MCHC 07/26/2021 33.8  31.5 - 35.7 g/dL Final   • RDW 07/26/2021 13.2  12.3 - 15.4 % Final   • RDW-SD 07/26/2021 46.1  37.0 - 54.0 fl Final   • MPV 07/26/2021 11.4  6.0 - 12.0 fL Final   • Platelets 07/26/2021 167  140 - 450 10*3/mm3 Final   • Neutrophil % 07/26/2021 58.2  42.7 - 76.0 % Final   • Lymphocyte % 07/26/2021 32.6  19.6 - 45.3 % Final   • Monocyte % 07/26/2021 6.7  5.0 - 12.0 % Final   • Eosinophil % 07/26/2021 1.7  0.3 - 6.2 % Final   • Basophil % 07/26/2021 0.6  0.0 - 1.5 % Final   • Immature Grans % 07/26/2021 0.2  0.0 - 0.5 % Final   • Neutrophils, Absolute 07/26/2021 4.73  1.70 - 7.00 10*3/mm3 Final   • Lymphocytes, Absolute 07/26/2021 2.66  0.70 - 3.10 10*3/mm3 Final   • Monocytes, Absolute 07/26/2021 0.55  0.10 - 0.90 10*3/mm3 Final   • Eosinophils, Absolute 07/26/2021 0.14  0.00 - 0.40 10*3/mm3 Final   • Basophils, Absolute 07/26/2021 0.05  0.00 - 0.20 10*3/mm3 Final   • Immature Grans, Absolute 07/26/2021 0.02  0.00 - 0.05 10*3/mm3 Final   • nRBC 07/26/2021 0.0  0.0 - 0.2 /100 WBC Final       Assessment & Plan   Problems Addressed this Visit    None  Visit Diagnoses     Severe episode of recurrent major depressive disorder, with psychotic features    -  Primary    Relevant Medications    OXcarbazepine (TRILEPTAL) 150 MG tablet    ARIPiprazole (ABILIFY) 20 MG tablet    benztropine (COGENTIN) 1 MG  tablet    busPIRone (BUSPAR) 15 MG tablet    hydrOXYzine pamoate (VISTARIL) 25 MG capsule    PARoxetine (PAXIL) 10 MG tablet    Generalized anxiety disorder        Relevant Medications    OXcarbazepine (TRILEPTAL) 150 MG tablet    ARIPiprazole (ABILIFY) 20 MG tablet    busPIRone (BUSPAR) 15 MG tablet    hydrOXYzine pamoate (VISTARIL) 25 MG capsule    PARoxetine (PAXIL) 10 MG tablet    Medication management        Relevant Medications    benztropine (COGENTIN) 1 MG tablet      Diagnoses       Codes Comments    Severe episode of recurrent major depressive disorder, with psychotic features    -  Primary ICD-10-CM: F33.3  ICD-9-CM: 296.34     Generalized anxiety disorder     ICD-10-CM: F41.1  ICD-9-CM: 300.02     Medication management     ICD-10-CM: Z79.899  ICD-9-CM: V58.69         Social History     Tobacco Use   Smoking Status Never   Smokeless Tobacco Never     MAGGIE reviewed and appropriate. Patient counseled on use of controlled substances.     -The benefits of a healthy diet and exercise were discussed with patient, especially the positive effects they have on mental health. Patient encouraged to consider lifestyle modification regarding  diet and exercise patterns to maximize results of mental health treatment.  -Reviewed previous available documentation  -Reviewed most recent available labs   -Lengthy discussion with patient on the possible side effects of antipsychotic medications including increased cholesterol, increased blood sugar, and possibility of weight gain.  Also discussed the need to monitor lab work associated with this.  The risk of muscle movement disorders with this class of medication was also discussed.  I've explained to him that drugs of the SSRI class can have side effects such as weight gain, sexual dysfunction, insomnia, headache, nausea. These medications are generally effective at alleviating symptoms of anxiety and/or depression. Let me know if significant side effects do  occur.    -The patient is being prescribed a controlled substance as part of the treatment plan. -The patient has been educated of appropriate use of the medication, including risks and side effects such as somnolence, limited ability to drive and/or work or function safely, potential for dependence, respiratory depression, falls, change in blood pressure, changes in heart rhythm or heart rate, activation of other mental illnesses, and overdose among others. Patient is also informed that the medication is to be used by the patient only, and to avoid any combined use of ETOH, or other substances, with this medication unless prescribed and as directed by a Provider.  The patient verbalized understanding and agreement with this in their own words.      Visit Diagnoses:    ICD-10-CM ICD-9-CM   1. Severe episode of recurrent major depressive disorder, with psychotic features  F33.3 296.34   2. Generalized anxiety disorder  F41.1 300.02   3. Medication management  Z79.899 V58.69       TREATMENT PLAN/GOALS: Continue supportive psychotherapy efforts and medications as indicated. Treatment and medication options discussed during today's visit. Patient acknowledged and verbally consented to continue with current treatment plan and was educated on the importance of compliance with treatment and follow-up appointments.    MEDICATION ISSUES:    Discussed medication options and treatment plan of prescribed medication as well as the risks, benefits, and side effects including potential falls, possible impaired driving and metabolic adversities among others. Patient is agreeable to call the office with any worsening of symptoms or onset of side effects. Patient is agreeable to call 911 or go to the nearest ER should he/she begin having SI/HI.     MEDS ORDERED DURING VISIT:  New Medications Ordered This Visit   Medications   • OXcarbazepine (TRILEPTAL) 150 MG tablet     Sig: Take 1 tablet by mouth 2 (Two) Times a Day.     Dispense:   60 tablet     Refill:  2   • ARIPiprazole (ABILIFY) 20 MG tablet     Sig: Take 1.5 tablets by mouth Daily.     Dispense:  45 tablet     Refill:  2   • benztropine (COGENTIN) 1 MG tablet     Sig: Take 1 tablet by mouth 2 (Two) Times a Day.     Dispense:  60 tablet     Refill:  2   • busPIRone (BUSPAR) 15 MG tablet     Sig: Take 1 tablet by mouth 3 (Three) Times a Day.     Dispense:  90 tablet     Refill:  2   • hydrOXYzine pamoate (VISTARIL) 25 MG capsule     Sig: Take 1 capsule by mouth 3 (Three) Times a Day As Needed for Anxiety.     Dispense:  90 capsule     Refill:  2   • PARoxetine (PAXIL) 10 MG tablet     Sig: Take 1 tablet by mouth Daily.     Dispense:  30 tablet     Refill:  2       Return in about 3 months (around 8/24/2023).     -Continue aripiprazole 20 mg tablet take 1.5  tablets by mouth daily for depression and anxiety.  -Continue  hydroxyzine 25 mg capsule take 1 capsule by mouth 3 times a day as needed for anxiety.  -Continue buspirone 15 mg tablet take 1 tablet 3 times a day for anxiety.  -Continue Paxil 10 mg tablet take 1 tablet by mouth daily for depression and anxiety  -Increase Cogentin 1 mg tablet take 1 tablet by mouth 2 times a day for trem  -Continue oxcarbazapine 150 mg tablet, take 1 tablet twice a day for mood.    I spent 30 minutes caring for Tanner on this date of service. This time includes time spent by me in the following activities: preparing for the visit, obtaining and/or reviewing a separately obtained history, performing a medically appropriate examination and/or evaluation, counseling and educating the patient/family/caregiver, ordering medications, tests, or procedures and documenting information in the medical record    Interactive Complexity Yes If yes, due to:  Third-Party Involvement Other:          This document has been electronically signed by ARSEN Martin  May 24, 2023 14:41 EDT    Part of this note may be an electronic transcription/translation  of spoken language to printed text using the Dragon Dictation System.

## 2023-05-24 ENCOUNTER — TELEMEDICINE (OUTPATIENT)
Dept: PSYCHIATRY | Facility: CLINIC | Age: 48
End: 2023-05-24
Payer: MEDICARE

## 2023-05-24 DIAGNOSIS — F41.1 GENERALIZED ANXIETY DISORDER: ICD-10-CM

## 2023-05-24 DIAGNOSIS — F33.3 SEVERE EPISODE OF RECURRENT MAJOR DEPRESSIVE DISORDER, WITH PSYCHOTIC FEATURES: Primary | ICD-10-CM

## 2023-05-24 DIAGNOSIS — Z79.899 MEDICATION MANAGEMENT: ICD-10-CM

## 2023-05-24 RX ORDER — BUSPIRONE HYDROCHLORIDE 15 MG/1
15 TABLET ORAL 3 TIMES DAILY
Qty: 90 TABLET | Refills: 2 | Status: SHIPPED | OUTPATIENT
Start: 2023-05-24

## 2023-05-24 RX ORDER — BENZTROPINE MESYLATE 1 MG/1
1 TABLET ORAL 2 TIMES DAILY
Qty: 60 TABLET | Refills: 2 | Status: SHIPPED | OUTPATIENT
Start: 2023-05-24

## 2023-05-24 RX ORDER — PAROXETINE 10 MG/1
10 TABLET, FILM COATED ORAL DAILY
Qty: 30 TABLET | Refills: 2 | Status: SHIPPED | OUTPATIENT
Start: 2023-05-24

## 2023-05-24 RX ORDER — HYDROXYZINE PAMOATE 25 MG/1
25 CAPSULE ORAL 3 TIMES DAILY PRN
Qty: 90 CAPSULE | Refills: 2 | Status: SHIPPED | OUTPATIENT
Start: 2023-05-24

## 2023-05-24 RX ORDER — OXCARBAZEPINE 150 MG/1
150 TABLET, FILM COATED ORAL 2 TIMES DAILY
Qty: 60 TABLET | Refills: 2 | Status: SHIPPED | OUTPATIENT
Start: 2023-05-24

## 2023-05-24 RX ORDER — ARIPIPRAZOLE 20 MG/1
30 TABLET ORAL DAILY
Qty: 45 TABLET | Refills: 2 | Status: SHIPPED | OUTPATIENT
Start: 2023-05-24

## 2023-09-07 NOTE — PROGRESS NOTES
"This provider is located at the [facility] (through [facility]), [address], [place], [address] using a secure MyChart Video Visit through [employer]. Patient is being seen remotely via telehealth at their home address in [place], and stated they are in a secure environment for this session. The patient's condition being diagnosed/treated is appropriate for telemedicine. The provider identified herself as well as her credentials.   The patient, and/or patients guardian, consent to be seen remotely, and when consent is given they understand that the consent allows for patient identifiable information to be sent to a third party as needed.   They may refuse to be seen remotely at any time. The electronic data is encrypted and password protected, and the patient and/or guardian has been advised of the potential risks to privacy not withstanding such measures.      Roscoe Bonilla is a 47 y.o. male who presents today for follow up    Chief Complaint:  Anxiety and depression    History of Present Illness:   History of Present Illness  Today is a follow-up visit. Accompanied by Shanelle     Medication compliance: patient is compliant with medications, denies SE.  Depression rated 10/10, with 10 being the worst.  Anxiety rated 10/10, with 10 being the worst.    Sleep is good, getting about 7 hours a night,  Nightmares: Frequent, dreams about his  father.  Appetite is good.  Hallucinations: Patient has auditory hallucinations, hearing someone talking to him, they don't say \"bad things\".  Self-harm: Patient denies thoughts of self-harm.  Alcohol use: no  Drug use: no  Marijuana use: no     Chronic health issues, no acute physical or medical issues today.    Details: he is having increased shaking, requesting to placed on Ativan again. He has been agitated.      The following portions of the patient's history were reviewed and updated as appropriate: " allergies, current medications, past family history, past medical history, past social history, past surgical history and problem list.      Past Medical History:  Past Medical History:   Diagnosis Date    Anxiety     Depression     Diabetes     GERD (gastroesophageal reflux disease)     Gout     High cholesterol        Social History:  Social History     Socioeconomic History    Marital status:    Tobacco Use    Smoking status: Never    Smokeless tobacco: Never   Vaping Use    Vaping Use: Never used   Substance and Sexual Activity    Alcohol use: Never    Drug use: Never    Sexual activity: Defer       Family History:  History reviewed. No pertinent family history.    Past Surgical History:  Past Surgical History:   Procedure Laterality Date    APPENDECTOMY      CHOLECYSTECTOMY         Problem List:  There is no problem list on file for this patient.       Allergy:   Allergies   Allergen Reactions    Codeine Nausea Only    Lopid [Gemfibrozil] Hives    Metformin Dizziness    Phenergan [Promethazine] Other (See Comments)     Makes patient sleep too much.      Sulfa Antibiotics Hives, Itching and Nausea Only        Current Medications:   Current Outpatient Medications   Medication Sig Dispense Refill    ARIPiprazole (ABILIFY) 20 MG tablet Take 1.5 tablets by mouth Daily. 45 tablet 2    benztropine (COGENTIN) 1 MG tablet Take 1 tablet by mouth 2 (Two) Times a Day. 60 tablet 2    busPIRone (BUSPAR) 15 MG tablet Take 1 tablet by mouth 3 (Three) Times a Day. 90 tablet 2    hydrOXYzine pamoate (VISTARIL) 25 MG capsule Take 1 capsule by mouth 3 (Three) Times a Day As Needed for Anxiety. 90 capsule 2    LORazepam (Ativan) 0.5 MG tablet Take 1 tablet up to twice a day as needed for anxiety 60 tablet 0    OXcarbazepine (TRILEPTAL) 150 MG tablet Take 1 tablet by mouth 2 (Two) Times a Day. 60 tablet 2    PARoxetine (PAXIL) 10 MG tablet Take 1 tablet by mouth Daily. 30 tablet 2    allopurinol (ZYLOPRIM) 300 MG tablet        ibuprofen (ADVIL,MOTRIN) 600 MG tablet       Januvia 100 MG tablet       Lantus SoloStar 100 UNIT/ML injection pen       levocetirizine (XYZAL) 5 MG tablet       omega-3 acid ethyl esters (LOVAZA) 1 g capsule       omeprazole (priLOSEC) 40 MG capsule       simvastatin (ZOCOR) 40 MG tablet       tamsulosin (FLOMAX) 0.4 MG capsule 24 hr capsule        No current facility-administered medications for this visit.       Review of Symptoms:    Review of Systems   Psychiatric/Behavioral:  Positive for agitation, dysphoric mood, hallucinations, sleep disturbance, depressed mood and stress. Negative for self-injury and suicidal ideas. The patient is nervous/anxious.    All other systems reviewed and are negative.      Physical Exam:   There were no vitals taken for this visit.  There is no height or weight on file to calculate BMI.    9/13/23    MENTAL STATUS EXAM   General Appearance:  Cleanly groomed and dressed  Eye Contact:  Good eye contact  Attitude:  Cooperative  Motor Activity:  Normal gait, posture  Speech:  Normal rate, tone, volume  Language:  Spontaneous  Mood and affect:  Depressed and anxious  Hopelessness:  Denies  Thought Process:  Logical  Associations/ Thought Content:  No delusions  Hallucinations:  None  Suicidal Ideations:  Not present  Homicidal Ideation:  Not present  Sensorium:  Alert  Orientation:  Person, place, time and situation  Insight:  Limited  Judgement:  Fair  Reliability:  Fair  Impulse Control:  Fair     PHQ-Score Total:  PHQ-9 Total Score:        Lab Results:   No visits with results within 1 Month(s) from this visit.   Latest known visit with results is:   Office Visit on 07/26/2021   Component Date Value Ref Range Status    External Amphetamine Screen Urine 07/26/2021 Negative   Final    Amphetamine Cut-Off 07/26/2021 1000ng/ml   Final    External Benzodiazepine Screen Uri* 07/26/2021 Negative   Final    Benzodiazipine Cut-Off 07/26/2021 300ng/ml   Final    External Cocaine Screen Urine  07/26/2021 Negative   Final    Cocaine Cut-Off 07/26/2021 300ng/ml   Final    External THC Screen Urine 07/26/2021 Negative   Final    THC Cut-Off 07/26/2021 50ng/ml   Final    External Methadone Screen Urine 07/26/2021 Negative   Final    Methadone Cut-Off 07/26/2021 300ng/ml   Final    External Methamphetamine Screen Ur* 07/26/2021 Negative   Final    Methamphetamine Cut-Off 07/26/2021 1000ng/ml   Final    External Oxycodone Screen Urine 07/26/2021 Negative   Final    Oxycodone Cut-Off 07/26/2021 100ng/ml   Final    External Buprenorphine Screen Urine 07/26/2021 Negative   Final    Buprenorphine Cut-Off 07/26/2021 10ng/ml   Final    External MDMA 07/26/2021 Negative   Final    MDMA Cut-Off 07/26/2021 500ng/ml   Final    External Opiates Screen Urine 07/26/2021 Negative   Final    Opiates Cut-Off 07/26/2021 300ng/ml   Final    Glucose 07/26/2021 117 (H)  65 - 99 mg/dL Final    BUN 07/26/2021 12  6 - 20 mg/dL Final    Creatinine 07/26/2021 1.07  0.76 - 1.27 mg/dL Final    Sodium 07/26/2021 143  136 - 145 mmol/L Final    Potassium 07/26/2021 3.4 (L)  3.5 - 5.2 mmol/L Final    Chloride 07/26/2021 104  98 - 107 mmol/L Final    CO2 07/26/2021 27.1  22.0 - 29.0 mmol/L Final    Calcium 07/26/2021 9.4  8.6 - 10.5 mg/dL Final    Total Protein 07/26/2021 6.5  6.0 - 8.5 g/dL Final    Albumin 07/26/2021 4.70  3.50 - 5.20 g/dL Final    ALT (SGPT) 07/26/2021 44 (H)  1 - 41 U/L Final    AST (SGOT) 07/26/2021 46 (H)  1 - 40 U/L Final    Alkaline Phosphatase 07/26/2021 77  39 - 117 U/L Final    Total Bilirubin 07/26/2021 0.8  0.0 - 1.2 mg/dL Final    eGFR Non  Amer 07/26/2021 74  >60 mL/min/1.73 Final    Globulin 07/26/2021 1.8  gm/dL Final    A/G Ratio 07/26/2021 2.6  g/dL Final    BUN/Creatinine Ratio 07/26/2021 11.2  7.0 - 25.0 Final    Anion Gap 07/26/2021 11.9  5.0 - 15.0 mmol/L Final    Hemoglobin A1C 07/26/2021 7.51 (H)  4.80 - 5.60 % Final    Total Cholesterol 07/26/2021 166  0 - 200 mg/dL Final    Triglycerides  07/26/2021 314 (H)  0 - 150 mg/dL Final    HDL Cholesterol 07/26/2021 38 (L)  40 - 60 mg/dL Final    LDL Cholesterol  07/26/2021 77  0 - 100 mg/dL Final    VLDL Cholesterol 07/26/2021 51 (H)  5 - 40 mg/dL Final    LDL/HDL Ratio 07/26/2021 1.72   Final    TSH 07/26/2021 3.640  0.270 - 4.200 uIU/mL Final    Free T4 07/26/2021 1.08  0.93 - 1.70 ng/dL Final    WBC 07/26/2021 8.15  3.40 - 10.80 10*3/mm3 Final    RBC 07/26/2021 5.11  4.14 - 5.80 10*6/mm3 Final    Hemoglobin 07/26/2021 16.2  13.0 - 17.7 g/dL Final    Hematocrit 07/26/2021 47.9  37.5 - 51.0 % Final    MCV 07/26/2021 93.7  79.0 - 97.0 fL Final    MCH 07/26/2021 31.7  26.6 - 33.0 pg Final    MCHC 07/26/2021 33.8  31.5 - 35.7 g/dL Final    RDW 07/26/2021 13.2  12.3 - 15.4 % Final    RDW-SD 07/26/2021 46.1  37.0 - 54.0 fl Final    MPV 07/26/2021 11.4  6.0 - 12.0 fL Final    Platelets 07/26/2021 167  140 - 450 10*3/mm3 Final    Neutrophil % 07/26/2021 58.2  42.7 - 76.0 % Final    Lymphocyte % 07/26/2021 32.6  19.6 - 45.3 % Final    Monocyte % 07/26/2021 6.7  5.0 - 12.0 % Final    Eosinophil % 07/26/2021 1.7  0.3 - 6.2 % Final    Basophil % 07/26/2021 0.6  0.0 - 1.5 % Final    Immature Grans % 07/26/2021 0.2  0.0 - 0.5 % Final    Neutrophils, Absolute 07/26/2021 4.73  1.70 - 7.00 10*3/mm3 Final    Lymphocytes, Absolute 07/26/2021 2.66  0.70 - 3.10 10*3/mm3 Final    Monocytes, Absolute 07/26/2021 0.55  0.10 - 0.90 10*3/mm3 Final    Eosinophils, Absolute 07/26/2021 0.14  0.00 - 0.40 10*3/mm3 Final    Basophils, Absolute 07/26/2021 0.05  0.00 - 0.20 10*3/mm3 Final    Immature Grans, Absolute 07/26/2021 0.02  0.00 - 0.05 10*3/mm3 Final    nRBC 07/26/2021 0.0  0.0 - 0.2 /100 WBC Final       Assessment & Plan   Problems Addressed this Visit    None  Visit Diagnoses       Severe episode of recurrent major depressive disorder, with psychotic features    -  Primary    Relevant Medications    PARoxetine (PAXIL) 10 MG tablet    OXcarbazepine (TRILEPTAL) 150 MG tablet     LORazepam (Ativan) 0.5 MG tablet    hydrOXYzine pamoate (VISTARIL) 25 MG capsule    busPIRone (BUSPAR) 15 MG tablet    benztropine (COGENTIN) 1 MG tablet    ARIPiprazole (ABILIFY) 20 MG tablet    Generalized anxiety disorder        Relevant Medications    PARoxetine (PAXIL) 10 MG tablet    OXcarbazepine (TRILEPTAL) 150 MG tablet    LORazepam (Ativan) 0.5 MG tablet    hydrOXYzine pamoate (VISTARIL) 25 MG capsule    busPIRone (BUSPAR) 15 MG tablet    ARIPiprazole (ABILIFY) 20 MG tablet    Medication management        Relevant Medications    benztropine (COGENTIN) 1 MG tablet          Diagnoses         Codes Comments    Severe episode of recurrent major depressive disorder, with psychotic features    -  Primary ICD-10-CM: F33.3  ICD-9-CM: 296.34     Generalized anxiety disorder     ICD-10-CM: F41.1  ICD-9-CM: 300.02     Medication management     ICD-10-CM: Z79.899  ICD-9-CM: V58.69           Social History     Tobacco Use   Smoking Status Never   Smokeless Tobacco Never     MAGGIE reviewed and appropriate. Patient counseled on use of controlled substances.     -The benefits of a healthy diet and exercise were discussed with patient, especially the positive effects they have on mental health. Patient encouraged to consider lifestyle modification regarding  diet and exercise patterns to maximize results of mental health treatment.  -Reviewed previous available documentation  -Reviewed most recent available labs   -Lengthy discussion with patient on the possible side effects of antipsychotic medications including increased cholesterol, increased blood sugar, and possibility of weight gain.  Also discussed the need to monitor lab work associated with this.  The risk of muscle movement disorders with this class of medication was also discussed.  I've explained to him that drugs of the SSRI class can have side effects such as weight gain, sexual dysfunction, insomnia, headache, nausea. These medications are generally  effective at alleviating symptoms of anxiety and/or depression. Let me know if significant side effects do occur.    -The patient is being prescribed a controlled substance as part of the treatment plan. -The patient has been educated of appropriate use of the medication, including risks and side effects such as somnolence, limited ability to drive and/or work or function safely, potential for dependence, respiratory depression, falls, change in blood pressure, changes in heart rhythm or heart rate, activation of other mental illnesses, and overdose among others. Patient is also informed that the medication is to be used by the patient only, and to avoid any combined use of ETOH, or other substances, with this medication unless prescribed and as directed by a Provider.  The patient verbalized understanding and agreement with this in their own words.      Visit Diagnoses:    ICD-10-CM ICD-9-CM   1. Severe episode of recurrent major depressive disorder, with psychotic features  F33.3 296.34   2. Generalized anxiety disorder  F41.1 300.02   3. Medication management  Z79.899 V58.69       TREATMENT PLAN/GOALS: Continue supportive psychotherapy efforts and medications as indicated. Treatment and medication options discussed during today's visit. Patient acknowledged and verbally consented to continue with current treatment plan and was educated on the importance of compliance with treatment and follow-up appointments.    MEDICATION ISSUES:    Discussed medication options and treatment plan of prescribed medication as well as the risks, benefits, and side effects including potential falls, possible impaired driving and metabolic adversities among others. Patient is agreeable to call the office with any worsening of symptoms or onset of side effects. Patient is agreeable to call 911 or go to the nearest ER should he/she begin having SI/HI.     MEDS ORDERED DURING VISIT:  New Medications Ordered This Visit   Medications     PARoxetine (PAXIL) 10 MG tablet     Sig: Take 1 tablet by mouth Daily.     Dispense:  30 tablet     Refill:  2    OXcarbazepine (TRILEPTAL) 150 MG tablet     Sig: Take 1 tablet by mouth 2 (Two) Times a Day.     Dispense:  60 tablet     Refill:  2    LORazepam (Ativan) 0.5 MG tablet     Sig: Take 1 tablet up to twice a day as needed for anxiety     Dispense:  60 tablet     Refill:  0    hydrOXYzine pamoate (VISTARIL) 25 MG capsule     Sig: Take 1 capsule by mouth 3 (Three) Times a Day As Needed for Anxiety.     Dispense:  90 capsule     Refill:  2    busPIRone (BUSPAR) 15 MG tablet     Sig: Take 1 tablet by mouth 3 (Three) Times a Day.     Dispense:  90 tablet     Refill:  2    benztropine (COGENTIN) 1 MG tablet     Sig: Take 1 tablet by mouth 2 (Two) Times a Day.     Dispense:  60 tablet     Refill:  2    ARIPiprazole (ABILIFY) 20 MG tablet     Sig: Take 1.5 tablets by mouth Daily.     Dispense:  45 tablet     Refill:  2       Return in about 3 months (around 12/13/2023).     -Continue aripiprazole 20 mg tablet take 1.5  tablets by mouth daily for depression and anxiety.  -Continue  hydroxyzine 25 mg capsule take 1 capsule by mouth 3 times a day as needed for anxiety.  -Continue buspirone 15 mg tablet take 1 tablet 3 times a day for anxiety.  -Continue Paxil 10 mg tablet take 1 tablet by mouth daily for depression and anxiety  -Continue Cogentin 1 mg tablet take 1 tablet by mouth 2 times a day for trem  -Continue oxcarbazapine 150 mg tablet, take 1 tablet twice a day for mood.    -Start Ativan 0.5 mg tablet take 1 tablet by mouth twice a day as needed for anxiety    I spent 30 minutes caring for Tanner on this date of service. This time includes time spent by me in the following activities: preparing for the visit, obtaining and/or reviewing a separately obtained history, performing a medically appropriate examination and/or evaluation, counseling and educating the patient/family/caregiver, ordering medications, tests,  or procedures, and documenting information in the medical record      Interactive Complexity Yes If yes, due to:  Third-Party Involvement Other:          This document has been electronically signed by ARSEN Martin  September 13, 2023 12:37 EDT    Part of this note may be an electronic transcription/translation of spoken language to printed text using the Dragon Dictation System.

## 2023-09-13 ENCOUNTER — TELEMEDICINE (OUTPATIENT)
Dept: PSYCHIATRY | Facility: CLINIC | Age: 48
End: 2023-09-13
Payer: MEDICARE

## 2023-09-13 DIAGNOSIS — Z79.899 MEDICATION MANAGEMENT: ICD-10-CM

## 2023-09-13 DIAGNOSIS — F33.3 SEVERE EPISODE OF RECURRENT MAJOR DEPRESSIVE DISORDER, WITH PSYCHOTIC FEATURES: Primary | ICD-10-CM

## 2023-09-13 DIAGNOSIS — F41.1 GENERALIZED ANXIETY DISORDER: ICD-10-CM

## 2023-09-13 RX ORDER — PAROXETINE 10 MG/1
10 TABLET, FILM COATED ORAL DAILY
Qty: 30 TABLET | Refills: 2 | Status: SHIPPED | OUTPATIENT
Start: 2023-09-13

## 2023-09-13 RX ORDER — BENZTROPINE MESYLATE 1 MG/1
1 TABLET ORAL 2 TIMES DAILY
Qty: 60 TABLET | Refills: 2 | Status: SHIPPED | OUTPATIENT
Start: 2023-09-13

## 2023-09-13 RX ORDER — HYDROXYZINE PAMOATE 25 MG/1
25 CAPSULE ORAL 3 TIMES DAILY PRN
Qty: 90 CAPSULE | Refills: 2 | Status: SHIPPED | OUTPATIENT
Start: 2023-09-13

## 2023-09-13 RX ORDER — LORAZEPAM 0.5 MG/1
TABLET ORAL
Qty: 60 TABLET | Refills: 0 | Status: SHIPPED | OUTPATIENT
Start: 2023-09-13

## 2023-09-13 RX ORDER — OXCARBAZEPINE 150 MG/1
150 TABLET, FILM COATED ORAL 2 TIMES DAILY
Qty: 60 TABLET | Refills: 2 | Status: SHIPPED | OUTPATIENT
Start: 2023-09-13

## 2023-09-13 RX ORDER — ARIPIPRAZOLE 20 MG/1
30 TABLET ORAL DAILY
Qty: 45 TABLET | Refills: 2 | Status: SHIPPED | OUTPATIENT
Start: 2023-09-13

## 2023-09-13 RX ORDER — BUSPIRONE HYDROCHLORIDE 15 MG/1
15 TABLET ORAL 3 TIMES DAILY
Qty: 90 TABLET | Refills: 2 | Status: SHIPPED | OUTPATIENT
Start: 2023-09-13

## 2023-12-01 DIAGNOSIS — F33.3 SEVERE EPISODE OF RECURRENT MAJOR DEPRESSIVE DISORDER, WITH PSYCHOTIC FEATURES: ICD-10-CM

## 2023-12-01 DIAGNOSIS — Z79.899 MEDICATION MANAGEMENT: ICD-10-CM

## 2023-12-01 RX ORDER — BENZTROPINE MESYLATE 1 MG/1
1 TABLET ORAL 2 TIMES DAILY
Qty: 60 TABLET | Refills: 2 | Status: SHIPPED | OUTPATIENT
Start: 2023-12-01

## 2023-12-13 DIAGNOSIS — F33.3 SEVERE EPISODE OF RECURRENT MAJOR DEPRESSIVE DISORDER, WITH PSYCHOTIC FEATURES: ICD-10-CM

## 2023-12-13 DIAGNOSIS — F41.1 GENERALIZED ANXIETY DISORDER: ICD-10-CM

## 2023-12-13 RX ORDER — PAROXETINE 10 MG/1
10 TABLET, FILM COATED ORAL DAILY
Qty: 30 TABLET | Refills: 2 | Status: SHIPPED | OUTPATIENT
Start: 2023-12-13

## 2023-12-13 RX ORDER — LORAZEPAM 0.5 MG/1
TABLET ORAL
Qty: 60 TABLET | Refills: 0 | Status: SHIPPED | OUTPATIENT
Start: 2023-12-13

## 2023-12-13 RX ORDER — OXCARBAZEPINE 150 MG/1
150 TABLET, FILM COATED ORAL 2 TIMES DAILY
Qty: 60 TABLET | Refills: 2 | Status: SHIPPED | OUTPATIENT
Start: 2023-12-13

## 2023-12-13 RX ORDER — HYDROXYZINE PAMOATE 25 MG/1
25 CAPSULE ORAL 3 TIMES DAILY PRN
Qty: 90 CAPSULE | Refills: 2 | Status: SHIPPED | OUTPATIENT
Start: 2023-12-13

## 2023-12-13 RX ORDER — ARIPIPRAZOLE 20 MG/1
30 TABLET ORAL DAILY
Qty: 45 TABLET | Refills: 2 | Status: SHIPPED | OUTPATIENT
Start: 2023-12-13

## 2023-12-13 RX ORDER — BUSPIRONE HYDROCHLORIDE 15 MG/1
15 TABLET ORAL 3 TIMES DAILY
Qty: 90 TABLET | Refills: 2 | Status: SHIPPED | OUTPATIENT
Start: 2023-12-13

## 2023-12-13 NOTE — TELEPHONE ENCOUNTER
Spouse called states  wont be available to help Tanner with telehealth visit tomorrow and they've been unable to reschedule before the new year. Requesting refills on all medicine to get patient thru till he is able to fine new care after the first of the year due to not being able to be seen here anymore due to insurance

## 2024-03-21 DIAGNOSIS — Z79.899 MEDICATION MANAGEMENT: ICD-10-CM

## 2024-03-21 DIAGNOSIS — F33.3 SEVERE EPISODE OF RECURRENT MAJOR DEPRESSIVE DISORDER, WITH PSYCHOTIC FEATURES: ICD-10-CM

## 2024-03-21 RX ORDER — BENZTROPINE MESYLATE 1 MG/1
1 TABLET ORAL 2 TIMES DAILY
Qty: 60 TABLET | Refills: 2 | Status: SHIPPED | OUTPATIENT
Start: 2024-03-21

## 2024-07-19 DIAGNOSIS — F33.3 SEVERE EPISODE OF RECURRENT MAJOR DEPRESSIVE DISORDER, WITH PSYCHOTIC FEATURES: ICD-10-CM

## 2024-07-19 DIAGNOSIS — Z79.899 MEDICATION MANAGEMENT: ICD-10-CM

## 2024-07-22 RX ORDER — BENZTROPINE MESYLATE 1 MG/1
1 TABLET ORAL 2 TIMES DAILY
Qty: 60 TABLET | Refills: 0 | Status: SHIPPED | OUTPATIENT
Start: 2024-07-22